# Patient Record
Sex: FEMALE | Race: WHITE | NOT HISPANIC OR LATINO | Employment: OTHER | URBAN - METROPOLITAN AREA
[De-identification: names, ages, dates, MRNs, and addresses within clinical notes are randomized per-mention and may not be internally consistent; named-entity substitution may affect disease eponyms.]

---

## 2017-10-02 ENCOUNTER — ALLSCRIPTS OFFICE VISIT (OUTPATIENT)
Dept: OTHER | Facility: OTHER | Age: 62
End: 2017-10-02

## 2017-10-03 ENCOUNTER — LAB CONVERSION - ENCOUNTER (OUTPATIENT)
Dept: OTHER | Facility: OTHER | Age: 62
End: 2017-10-03

## 2017-10-03 LAB
25(OH)D3 SERPL-MCNC: 35 NG/ML (ref 30–100)
ANACHOICE(R) SCREEN (HISTORICAL): NEGATIVE
BASOPHILS # BLD AUTO: 0.6 %
BASOPHILS # BLD AUTO: 69 CELLS/UL (ref 0–200)
C-REACTIVE PROTEIN (HISTORICAL): 12.3 MG/L
DEPRECATED RDW RBC AUTO: 13.2 % (ref 11–15)
EOSINOPHIL # BLD AUTO: 1.4 %
EOSINOPHIL # BLD AUTO: 161 CELLS/UL (ref 15–500)
ERYTHROCYTE SEDIMENTATION RATE (HISTORICAL): 25 MM/H
HBA1C MFR BLD HPLC: 5.8 % OF TOTAL HGB
HCT VFR BLD AUTO: 45.2 % (ref 35–45)
HGB BLD-MCNC: 14.8 G/DL (ref 11.7–15.5)
LYMPHOCYTES # BLD AUTO: 18 %
LYMPHOCYTES # BLD AUTO: 2070 CELLS/UL (ref 850–3900)
MCH RBC QN AUTO: 30 PG (ref 27–33)
MCHC RBC AUTO-ENTMCNC: 32.7 G/DL (ref 32–36)
MCV RBC AUTO: 91.5 FL (ref 80–100)
MONOCYTES # BLD AUTO: 1081 CELLS/UL (ref 200–950)
MONOCYTES (HISTORICAL): 9.4 %
NEUTROPHILS # BLD AUTO: 70.6 %
NEUTROPHILS # BLD AUTO: 8119 CELLS/UL (ref 1500–7800)
PLATELET # BLD AUTO: 197 THOUSAND/UL (ref 140–400)
PMV BLD AUTO: 11.1 FL (ref 7.5–12.5)
RBC # BLD AUTO: 4.94 MILLION/UL (ref 3.8–5.1)
RHEUMATOID FACTOR (HISTORICAL): <14 IU/ML
T4 FREE SERPL-MCNC: 1 NG/DL (ref 0.8–1.8)
THYROID MICROSOMAL ANTIBODY (HISTORICAL): 332 IU/ML
TSH SERPL DL<=0.05 MIU/L-ACNC: 1.94 MIU/L (ref 0.4–4.5)
WBC # BLD AUTO: 11.5 THOUSAND/UL (ref 3.8–10.8)

## 2017-10-04 ENCOUNTER — GENERIC CONVERSION - ENCOUNTER (OUTPATIENT)
Dept: OTHER | Facility: OTHER | Age: 62
End: 2017-10-04

## 2017-10-23 ENCOUNTER — GENERIC CONVERSION - ENCOUNTER (OUTPATIENT)
Dept: OTHER | Facility: OTHER | Age: 62
End: 2017-10-23

## 2017-11-16 ENCOUNTER — GENERIC CONVERSION - ENCOUNTER (OUTPATIENT)
Dept: OTHER | Facility: OTHER | Age: 62
End: 2017-11-16

## 2018-01-14 VITALS
HEIGHT: 65 IN | WEIGHT: 234 LBS | DIASTOLIC BLOOD PRESSURE: 66 MMHG | BODY MASS INDEX: 38.99 KG/M2 | SYSTOLIC BLOOD PRESSURE: 144 MMHG

## 2018-01-15 NOTE — MISCELLANEOUS
Message  PC left msg returning her call, saw consult would recommend neurology and/or rheumatology and then if needed perhaps further endocrine follow up        Signatures   Electronically signed by : Arianna Lewis MD; Nov 16 2017  6:34PM EST                       (Author)

## 2018-01-16 NOTE — MISCELLANEOUS
Message  Results reviewed with patient advised endocrine follow up  Patient reported did have history of Grave's disease  Will make appointment with endocrine        Signatures   Electronically signed by : Wilson Campa MD; Oct  4 2017  9:31PM EST                       (Author)

## 2018-03-22 ENCOUNTER — DOCTOR'S OFFICE (OUTPATIENT)
Dept: URBAN - METROPOLITAN AREA CLINIC 137 | Facility: CLINIC | Age: 63
Setting detail: OPHTHALMOLOGY
End: 2018-03-22
Payer: COMMERCIAL

## 2018-03-22 DIAGNOSIS — H52.4: ICD-10-CM

## 2018-03-22 PROCEDURE — 92014 COMPRE OPH EXAM EST PT 1/>: CPT | Performed by: OPHTHALMOLOGY

## 2018-03-22 ASSESSMENT — REFRACTION_CURRENTRX
OD_OVR_VA: 20/
OD_OVR_VA: 20/
OS_ADD: +2.25
OD_OVR_VA: 20/
OD_VPRISM_DIRECTION: BF
OD_AXIS: 180
OD_CYLINDER: +1.25
OS_OVR_VA: 20/
OS_SPHERE: +1.00
OD_SPHERE: +1.00
OS_AXIS: 001
OS_VPRISM_DIRECTION: BF
OS_CYLINDER: +1.50
OS_OVR_VA: 20/
OD_ADD: +2.25
OS_OVR_VA: 20/

## 2018-03-22 ASSESSMENT — VISUAL ACUITY
OS_BCVA: 20/30
OD_BCVA: 20/40

## 2018-03-22 ASSESSMENT — REFRACTION_OUTSIDERX
OD_AXIS: 180
OD_VA3: 20/
OS_VA1: 20/60+2
OS_VA2: 20/
OU_VA: 20/
OD_CYLINDER: +1.25
OS_ADD: +2.50
OD_ADD: +2.25
OD_ADD: +2.50
OU_VA: 20/
OS_VA1: 20/30+2
OS_VA2: 20/40(J3)
OS_SPHERE: +0.25
OD_SPHERE: +1.25
OD_VA3: 20/
OS_CYLINDER: +1.25
OD_VA1: 20/25-3
OS_VA3: 20/
OD_SPHERE: +1.00
OS_AXIS: 125
OD_VA2: 20/
OD_VA1: 20/40+1
OS_ADD: +2.25
OS_CYLINDER: +1.50
OS_VA3: 20/
OS_SPHERE: +1.00
OD_CYLINDER: +1.25
OD_VA2: 20/40(J3)
OS_AXIS: 180
OD_AXIS: 180

## 2018-03-22 ASSESSMENT — SPHEQUIV_DERIVED
OS_SPHEQUIV: 0.625
OD_SPHEQUIV: 1.875

## 2018-03-22 ASSESSMENT — REFRACTION_AUTOREFRACTION
OS_CYLINDER: +1.75
OD_AXIS: 002
OS_SPHERE: -0.25
OS_AXIS: 178
OD_CYLINDER: +1.75
OD_SPHERE: +1.00

## 2018-03-22 ASSESSMENT — REFRACTION_MANIFEST
OD_VA1: 20/
OD_VA2: 20/
OS_VA3: 20/
OD_VA3: 20/
OS_VA1: 20/
OU_VA: 20/
OS_VA2: 20/

## 2018-03-22 ASSESSMENT — CONFRONTATIONAL VISUAL FIELD TEST (CVF)
OD_FINDINGS: FULL
OS_FINDINGS: FULL

## 2018-03-30 ENCOUNTER — DOCTOR'S OFFICE (OUTPATIENT)
Dept: URBAN - METROPOLITAN AREA CLINIC 137 | Facility: CLINIC | Age: 63
Setting detail: OPHTHALMOLOGY
End: 2018-03-30
Payer: COMMERCIAL

## 2018-03-30 DIAGNOSIS — H25.012: ICD-10-CM

## 2018-03-30 PROCEDURE — 92136 OPHTHALMIC BIOMETRY: CPT | Performed by: OPHTHALMOLOGY

## 2018-03-30 PROCEDURE — CATARACT K CATARACT KIT: Performed by: OPHTHALMOLOGY

## 2018-04-05 ENCOUNTER — DOCTOR'S OFFICE (OUTPATIENT)
Dept: URBAN - METROPOLITAN AREA CLINIC 137 | Facility: CLINIC | Age: 63
Setting detail: OPHTHALMOLOGY
End: 2018-04-05
Payer: COMMERCIAL

## 2018-04-05 DIAGNOSIS — H40.013: ICD-10-CM

## 2018-04-05 PROCEDURE — 92083 EXTENDED VISUAL FIELD XM: CPT | Performed by: OPHTHALMOLOGY

## 2018-04-05 PROCEDURE — 92133 CPTRZD OPH DX IMG PST SGM ON: CPT | Performed by: OPHTHALMOLOGY

## 2018-05-14 ENCOUNTER — AMBUL SURGICAL CARE (OUTPATIENT)
Dept: URBAN - METROPOLITAN AREA SURGERY 32 | Facility: SURGERY | Age: 63
Setting detail: OPHTHALMOLOGY
End: 2018-05-14
Payer: COMMERCIAL

## 2018-05-14 DIAGNOSIS — H25.12: ICD-10-CM

## 2018-05-14 PROCEDURE — 66984 XCAPSL CTRC RMVL W/O ECP: CPT | Performed by: OPHTHALMOLOGY

## 2018-05-14 PROCEDURE — G8918 PT W/O PREOP ORDER IV AB PRO: HCPCS | Performed by: OPHTHALMOLOGY

## 2018-05-14 PROCEDURE — G8907 PT DOC NO EVENTS ON DISCHARG: HCPCS | Performed by: OPHTHALMOLOGY

## 2018-05-15 ENCOUNTER — DOCTOR'S OFFICE (OUTPATIENT)
Dept: URBAN - METROPOLITAN AREA CLINIC 137 | Facility: CLINIC | Age: 63
Setting detail: OPHTHALMOLOGY
End: 2018-05-15
Payer: COMMERCIAL

## 2018-05-15 ENCOUNTER — RX ONLY (RX ONLY)
Age: 63
End: 2018-05-15

## 2018-05-15 DIAGNOSIS — H25.11: ICD-10-CM

## 2018-05-15 DIAGNOSIS — Z96.1: ICD-10-CM

## 2018-05-15 PROCEDURE — 92136 OPHTHALMIC BIOMETRY: CPT | Performed by: OPHTHALMOLOGY

## 2018-05-15 PROCEDURE — 99024 POSTOP FOLLOW-UP VISIT: CPT | Performed by: OPHTHALMOLOGY

## 2018-05-15 ASSESSMENT — REFRACTION_CURRENTRX
OS_OVR_VA: 20/
OS_OVR_VA: 20/
OD_CYLINDER: +1.25
OD_ADD: +2.25
OS_SPHERE: +1.00
OD_OVR_VA: 20/
OS_VPRISM_DIRECTION: BF
OD_SPHERE: +1.00
OD_OVR_VA: 20/
OD_VPRISM_DIRECTION: BF
OS_OVR_VA: 20/
OD_OVR_VA: 20/
OS_CYLINDER: +1.50
OS_ADD: +2.25
OD_AXIS: 180
OS_AXIS: 001

## 2018-05-15 ASSESSMENT — VISUAL ACUITY
OS_BCVA: 20/30
OD_BCVA: 20/300

## 2018-05-15 ASSESSMENT — REFRACTION_OUTSIDERX
OD_ADD: +2.25
OS_VA1: 20/30+2
OD_SPHERE: +1.00
OD_VA1: 20/25-3
OS_ADD: +2.50
OD_CYLINDER: +1.25
OS_VA2: 20/40(J3)
OS_SPHERE: +1.00
OS_SPHERE: +0.25
OS_VA1: 20/60+2
OD_VA3: 20/
OS_ADD: +2.25
OD_VA2: 20/40(J3)
OU_VA: 20/
OU_VA: 20/
OS_VA3: 20/
OD_SPHERE: +1.25
OS_VA2: 20/
OD_AXIS: 180
OS_VA3: 20/
OD_CYLINDER: +1.25
OD_AXIS: 180
OS_CYLINDER: +1.50
OS_AXIS: 125
OS_CYLINDER: +1.25
OD_VA3: 20/
OD_VA2: 20/
OD_VA1: 20/40+1
OS_AXIS: 180
OD_ADD: +2.50

## 2018-05-15 ASSESSMENT — REFRACTION_MANIFEST
OD_VA1: 20/
OD_VA3: 20/
OU_VA: 20/
OS_VA3: 20/
OD_VA2: 20/
OS_VA1: 20/
OS_VA2: 20/

## 2018-05-15 ASSESSMENT — SPHEQUIV_DERIVED
OD_SPHEQUIV: 1.875
OS_SPHEQUIV: 0.625

## 2018-05-15 ASSESSMENT — REFRACTION_AUTOREFRACTION
OS_AXIS: 178
OD_SPHERE: +1.00
OD_CYLINDER: +1.75
OS_CYLINDER: +1.75
OS_SPHERE: -0.25
OD_AXIS: 002

## 2018-05-22 ENCOUNTER — DOCTOR'S OFFICE (OUTPATIENT)
Dept: URBAN - METROPOLITAN AREA CLINIC 137 | Facility: CLINIC | Age: 63
Setting detail: OPHTHALMOLOGY
End: 2018-05-22
Payer: COMMERCIAL

## 2018-05-22 DIAGNOSIS — Z96.1: ICD-10-CM

## 2018-05-22 PROCEDURE — 99024 POSTOP FOLLOW-UP VISIT: CPT | Performed by: OPHTHALMOLOGY

## 2018-05-22 ASSESSMENT — REFRACTION_OUTSIDERX
OD_SPHERE: +1.00
OS_VA1: 20/30+2
OS_AXIS: 180
OS_CYLINDER: +1.25
OD_VA1: 20/40+1
OS_VA3: 20/
OD_CYLINDER: +1.25
OS_AXIS: 125
OS_CYLINDER: +1.50
OD_AXIS: 180
OD_VA2: 20/
OS_VA3: 20/
OU_VA: 20/
OD_VA3: 20/
OS_VA2: 20/
OD_ADD: +2.25
OU_VA: 20/
OS_SPHERE: +0.25
OS_VA2: 20/40(J3)
OD_ADD: +2.50
OD_CYLINDER: +1.25
OD_VA3: 20/
OD_AXIS: 180
OD_VA2: 20/40(J3)
OD_SPHERE: +1.25
OD_VA1: 20/25-3
OS_ADD: +2.25
OS_SPHERE: +1.00
OS_ADD: +2.50
OS_VA1: 20/60+2

## 2018-05-22 ASSESSMENT — REFRACTION_CURRENTRX
OS_VPRISM_DIRECTION: BF
OD_OVR_VA: 20/
OS_OVR_VA: 20/
OS_ADD: +2.25
OD_SPHERE: +1.00
OD_CYLINDER: +1.25
OS_CYLINDER: +1.50
OS_SPHERE: +1.00
OD_OVR_VA: 20/
OD_ADD: +2.25
OS_OVR_VA: 20/
OS_AXIS: 001
OS_OVR_VA: 20/
OD_AXIS: 180
OD_OVR_VA: 20/
OD_VPRISM_DIRECTION: BF

## 2018-05-22 ASSESSMENT — VISUAL ACUITY
OS_BCVA: 20/30-1
OD_BCVA: 20/60-2

## 2018-05-22 ASSESSMENT — REFRACTION_MANIFEST
OS_VA2: 20/
OU_VA: 20/
OS_VA1: 20/
OD_VA1: 20/
OD_VA3: 20/
OS_VA3: 20/
OD_VA2: 20/

## 2018-05-22 ASSESSMENT — SPHEQUIV_DERIVED
OS_SPHEQUIV: -0.25
OD_SPHEQUIV: 1.75

## 2018-05-22 ASSESSMENT — REFRACTION_AUTOREFRACTION
OD_SPHERE: +1.00
OD_CYLINDER: +1.50
OS_CYLINDER: +2.00
OS_AXIS: 005
OD_AXIS: 004
OS_SPHERE: -1.25

## 2018-06-11 ENCOUNTER — AMBUL SURGICAL CARE (OUTPATIENT)
Dept: URBAN - METROPOLITAN AREA SURGERY 32 | Facility: SURGERY | Age: 63
Setting detail: OPHTHALMOLOGY
End: 2018-06-11
Payer: COMMERCIAL

## 2018-06-11 DIAGNOSIS — H25.11: ICD-10-CM

## 2018-06-11 PROCEDURE — 66984 XCAPSL CTRC RMVL W/O ECP: CPT | Performed by: OPHTHALMOLOGY

## 2018-06-11 PROCEDURE — G8907 PT DOC NO EVENTS ON DISCHARG: HCPCS | Performed by: OPHTHALMOLOGY

## 2018-06-11 PROCEDURE — G8918 PT W/O PREOP ORDER IV AB PRO: HCPCS | Performed by: OPHTHALMOLOGY

## 2018-06-12 ENCOUNTER — RX ONLY (RX ONLY)
Age: 63
End: 2018-06-12

## 2018-06-12 ENCOUNTER — DOCTOR'S OFFICE (OUTPATIENT)
Dept: URBAN - METROPOLITAN AREA CLINIC 137 | Facility: CLINIC | Age: 63
Setting detail: OPHTHALMOLOGY
End: 2018-06-12
Payer: COMMERCIAL

## 2018-06-12 DIAGNOSIS — Z96.1: ICD-10-CM

## 2018-06-12 PROCEDURE — 99024 POSTOP FOLLOW-UP VISIT: CPT | Performed by: OPHTHALMOLOGY

## 2018-06-12 ASSESSMENT — REFRACTION_CURRENTRX
OD_OVR_VA: 20/
OS_VPRISM_DIRECTION: BF
OD_OVR_VA: 20/
OD_VPRISM_DIRECTION: BF
OS_ADD: +2.25
OS_OVR_VA: 20/
OD_OVR_VA: 20/
OS_OVR_VA: 20/
OD_SPHERE: +1.00
OS_SPHERE: +1.00
OS_CYLINDER: +1.50
OS_OVR_VA: 20/
OS_AXIS: 001
OD_CYLINDER: +1.25
OD_ADD: +2.25
OD_AXIS: 180

## 2018-06-12 ASSESSMENT — VISUAL ACUITY
OS_BCVA: 20/200
OD_BCVA: 20/70

## 2018-06-12 ASSESSMENT — REFRACTION_OUTSIDERX
OD_VA1: 20/40+1
OS_ADD: +2.50
OU_VA: 20/
OS_SPHERE: +1.00
OD_VA1: 20/25-3
OD_SPHERE: +1.00
OD_VA2: 20/40(J3)
OS_CYLINDER: +1.25
OS_SPHERE: +0.25
OD_AXIS: 180
OU_VA: 20/
OS_VA2: 20/40(J3)
OS_AXIS: 125
OS_CYLINDER: +1.50
OD_VA3: 20/
OD_CYLINDER: +1.25
OS_ADD: +2.25
OD_SPHERE: +1.25
OD_ADD: +2.25
OD_AXIS: 180
OD_CYLINDER: +1.25
OS_VA1: 20/30+2
OS_AXIS: 180
OS_VA1: 20/60+2
OS_VA3: 20/
OD_VA3: 20/
OS_VA2: 20/
OD_ADD: +2.50
OD_VA2: 20/
OS_VA3: 20/

## 2018-06-12 ASSESSMENT — REFRACTION_MANIFEST
OS_VA3: 20/
OS_VA1: 20/
OD_VA1: 20/
OD_VA2: 20/
OD_VA3: 20/
OU_VA: 20/
OS_VA2: 20/

## 2018-06-12 ASSESSMENT — REFRACTION_AUTOREFRACTION
OS_SPHERE: -1.25
OS_CYLINDER: +2.00
OD_AXIS: 004
OD_CYLINDER: +1.50
OD_SPHERE: +1.00
OS_AXIS: 005

## 2018-06-12 ASSESSMENT — SPHEQUIV_DERIVED
OS_SPHEQUIV: -0.25
OD_SPHEQUIV: 1.75

## 2018-06-19 ENCOUNTER — DOCTOR'S OFFICE (OUTPATIENT)
Dept: URBAN - METROPOLITAN AREA CLINIC 137 | Facility: CLINIC | Age: 63
Setting detail: OPHTHALMOLOGY
End: 2018-06-19
Payer: COMMERCIAL

## 2018-06-19 DIAGNOSIS — Z96.1: ICD-10-CM

## 2018-06-19 PROCEDURE — 99024 POSTOP FOLLOW-UP VISIT: CPT | Performed by: OPHTHALMOLOGY

## 2018-06-19 ASSESSMENT — REFRACTION_MANIFEST
OD_VA3: 20/
OS_VA1: 20/
OD_VA2: 20/
OU_VA: 20/
OS_VA2: 20/
OS_VA3: 20/
OD_VA1: 20/

## 2018-06-19 ASSESSMENT — REFRACTION_AUTOREFRACTION
OD_AXIS: 004
OS_SPHERE: -1.25
OD_CYLINDER: +1.50
OS_CYLINDER: +2.00
OS_AXIS: 005
OD_SPHERE: +1.00

## 2018-06-19 ASSESSMENT — REFRACTION_OUTSIDERX
OS_ADD: +2.25
OD_ADD: +2.50
OD_VA1: 20/25-3
OS_SPHERE: +1.00
OD_CYLINDER: +1.25
OD_VA3: 20/
OU_VA: 20/
OS_VA3: 20/
OS_ADD: +2.50
OD_VA1: 20/40+1
OS_CYLINDER: +1.50
OS_VA1: 20/30+2
OS_VA2: 20/40(J3)
OU_VA: 20/
OD_VA2: 20/40(J3)
OD_SPHERE: +1.25
OD_ADD: +2.25
OD_VA3: 20/
OS_CYLINDER: +1.25
OD_SPHERE: +1.00
OS_AXIS: 125
OS_AXIS: 180
OS_VA1: 20/60+2
OD_AXIS: 180
OD_CYLINDER: +1.25
OS_VA3: 20/
OD_AXIS: 180
OD_VA2: 20/
OS_VA2: 20/
OS_SPHERE: +0.25

## 2018-06-19 ASSESSMENT — REFRACTION_CURRENTRX
OS_AXIS: 001
OD_AXIS: 180
OS_OVR_VA: 20/
OS_CYLINDER: +1.50
OD_OVR_VA: 20/
OD_VPRISM_DIRECTION: BF
OD_OVR_VA: 20/
OD_ADD: +2.25
OD_OVR_VA: 20/
OS_ADD: +2.25
OD_SPHERE: +1.00
OS_OVR_VA: 20/
OS_SPHERE: +1.00
OD_CYLINDER: +1.25
OS_OVR_VA: 20/
OS_VPRISM_DIRECTION: BF

## 2018-06-19 ASSESSMENT — SPHEQUIV_DERIVED
OS_SPHEQUIV: -0.25
OD_SPHEQUIV: 1.75

## 2018-06-19 ASSESSMENT — VISUAL ACUITY
OD_BCVA: 20/50
OS_BCVA: 20/200

## 2018-06-20 ENCOUNTER — DOCTOR'S OFFICE (OUTPATIENT)
Dept: URBAN - METROPOLITAN AREA CLINIC 136 | Facility: CLINIC | Age: 63
Setting detail: OPHTHALMOLOGY
End: 2018-06-20
Payer: COMMERCIAL

## 2018-06-20 DIAGNOSIS — H27.131: ICD-10-CM

## 2018-06-20 DIAGNOSIS — H40.013: ICD-10-CM

## 2018-06-20 DIAGNOSIS — H43.811: ICD-10-CM

## 2018-06-20 DIAGNOSIS — Z96.1: ICD-10-CM

## 2018-06-20 PROCEDURE — 99024 POSTOP FOLLOW-UP VISIT: CPT | Performed by: OPHTHALMOLOGY

## 2018-06-20 ASSESSMENT — CONFRONTATIONAL VISUAL FIELD TEST (CVF)
OD_FINDINGS: FULL
OS_FINDINGS: FULL

## 2018-06-21 ASSESSMENT — REFRACTION_OUTSIDERX
OS_CYLINDER: +1.50
OS_VA2: 20/40(J3)
OD_VA2: 20/
OU_VA: 20/
OS_ADD: +2.25
OS_VA3: 20/
OD_AXIS: 180
OD_CYLINDER: +1.25
OD_CYLINDER: +1.25
OS_VA3: 20/
OS_VA2: 20/
OD_VA1: 20/25-3
OS_AXIS: 125
OU_VA: 20/
OD_AXIS: 180
OD_VA1: 20/40+1
OD_VA3: 20/
OS_VA1: 20/30+2
OS_ADD: +2.50
OS_CYLINDER: +1.25
OS_VA1: 20/60+2
OD_VA2: 20/40(J3)
OD_SPHERE: +1.00
OS_SPHERE: +0.25
OD_VA3: 20/
OS_AXIS: 180
OD_ADD: +2.50
OD_SPHERE: +1.25
OD_ADD: +2.25
OS_SPHERE: +1.00

## 2018-06-21 ASSESSMENT — REFRACTION_MANIFEST
OU_VA: 20/
OD_VA1: 20/
OD_VA2: 20/
OD_VA3: 20/
OS_VA2: 20/
OS_VA1: 20/
OS_VA3: 20/

## 2018-06-21 ASSESSMENT — REFRACTION_CURRENTRX
OS_OVR_VA: 20/
OD_OVR_VA: 20/
OS_SPHERE: +1.00
OD_OVR_VA: 20/
OD_OVR_VA: 20/
OS_CYLINDER: +1.50
OS_AXIS: 001
OD_VPRISM_DIRECTION: BF
OD_CYLINDER: +1.25
OS_OVR_VA: 20/
OS_ADD: +2.25
OD_SPHERE: +1.00
OS_OVR_VA: 20/
OD_ADD: +2.25
OS_VPRISM_DIRECTION: BF
OD_AXIS: 180

## 2018-06-21 ASSESSMENT — VISUAL ACUITY
OD_BCVA: 20/40
OS_BCVA: 20/400

## 2018-06-21 ASSESSMENT — REFRACTION_AUTOREFRACTION
OS_CYLINDER: +2.00
OD_SPHERE: +1.00
OD_AXIS: 004
OD_CYLINDER: +1.50
OS_SPHERE: -1.25
OS_AXIS: 005

## 2018-06-21 ASSESSMENT — SPHEQUIV_DERIVED
OD_SPHEQUIV: 1.75
OS_SPHEQUIV: -0.25

## 2018-06-28 ENCOUNTER — AMBUL SURGICAL CARE (OUTPATIENT)
Dept: URBAN - METROPOLITAN AREA SURGERY 32 | Facility: SURGERY | Age: 63
Setting detail: OPHTHALMOLOGY
End: 2018-06-28
Payer: COMMERCIAL

## 2018-06-28 DIAGNOSIS — H27.131: ICD-10-CM

## 2018-06-28 DIAGNOSIS — H33.311: ICD-10-CM

## 2018-06-28 DIAGNOSIS — H43.811: ICD-10-CM

## 2018-06-28 PROCEDURE — G8907 PT DOC NO EVENTS ON DISCHARG: HCPCS | Performed by: OPHTHALMOLOGY

## 2018-06-28 PROCEDURE — 66986 EXCHANGE LENS PROSTHESIS: CPT | Performed by: OPHTHALMOLOGY

## 2018-06-28 PROCEDURE — 67039 LASER TREATMENT OF RETINA: CPT | Performed by: OPHTHALMOLOGY

## 2018-06-28 PROCEDURE — G8918 PT W/O PREOP ORDER IV AB PRO: HCPCS | Performed by: OPHTHALMOLOGY

## 2018-06-29 ENCOUNTER — DOCTOR'S OFFICE (OUTPATIENT)
Dept: URBAN - METROPOLITAN AREA CLINIC 137 | Facility: CLINIC | Age: 63
Setting detail: OPHTHALMOLOGY
End: 2018-06-29
Payer: COMMERCIAL

## 2018-06-29 DIAGNOSIS — H27.131: ICD-10-CM

## 2018-06-29 DIAGNOSIS — Z96.1: ICD-10-CM

## 2018-06-29 DIAGNOSIS — H40.013: ICD-10-CM

## 2018-06-29 DIAGNOSIS — H43.811: ICD-10-CM

## 2018-06-29 PROCEDURE — 99024 POSTOP FOLLOW-UP VISIT: CPT | Performed by: OPHTHALMOLOGY

## 2018-06-29 ASSESSMENT — VISUAL ACUITY
OD_BCVA: 20/30-1
OS_BCVA: LP

## 2018-06-29 ASSESSMENT — SPHEQUIV_DERIVED
OD_SPHEQUIV: 1.75
OS_SPHEQUIV: -0.25

## 2018-06-29 ASSESSMENT — REFRACTION_AUTOREFRACTION
OS_AXIS: 005
OD_SPHERE: +1.00
OD_CYLINDER: +1.50
OD_AXIS: 004
OS_CYLINDER: +2.00
OS_SPHERE: -1.25

## 2018-07-02 ENCOUNTER — DOCTOR'S OFFICE (OUTPATIENT)
Dept: URBAN - METROPOLITAN AREA CLINIC 136 | Facility: CLINIC | Age: 63
Setting detail: OPHTHALMOLOGY
End: 2018-07-02
Payer: COMMERCIAL

## 2018-07-02 DIAGNOSIS — H27.131: ICD-10-CM

## 2018-07-02 PROCEDURE — 99024 POSTOP FOLLOW-UP VISIT: CPT | Performed by: OPHTHALMOLOGY

## 2018-07-02 ASSESSMENT — CORNEAL EDEMA CLINICAL DESCRIPTION: OD_CORNEALEDEMA: 1+

## 2018-07-02 ASSESSMENT — CONFRONTATIONAL VISUAL FIELD TEST (CVF)
OS_FINDINGS: FULL
OD_FINDINGS: FULL

## 2018-07-04 ASSESSMENT — REFRACTION_MANIFEST
OD_VA2: 20/
OD_VA2: 20/
OD_VA3: 20/
OD_VA1: 20/
OD_VA3: 20/
OD_VA2: 20/
OD_VA1: 20/
OU_VA: 20/
OS_VA3: 20/
OS_VA1: 20/
OS_VA2: 20/
OS_VA1: 20/
OS_VA2: 20/
OD_VA3: 20/
OU_VA: 20/
OS_VA3: 20/
OD_VA1: 20/
OU_VA: 20/
OS_VA2: 20/
OS_VA1: 20/
OS_VA3: 20/

## 2018-07-04 ASSESSMENT — REFRACTION_CURRENTRX
OD_OVR_VA: 20/
OD_OVR_VA: 20/
OS_OVR_VA: 20/
OS_OVR_VA: 20/
OD_OVR_VA: 20/
OS_OVR_VA: 20/

## 2018-07-04 ASSESSMENT — REFRACTION_AUTOREFRACTION
OS_SPHERE: -1.25
OD_SPHERE: +1.00
OS_CYLINDER: +2.00
OS_AXIS: 005
OD_AXIS: 004
OD_CYLINDER: +1.50

## 2018-07-04 ASSESSMENT — VISUAL ACUITY
OS_BCVA: 20/CF @FACE
OD_BCVA: 20/30-1

## 2018-07-04 ASSESSMENT — SPHEQUIV_DERIVED
OS_SPHEQUIV: -0.25
OD_SPHEQUIV: 1.75

## 2018-07-05 ENCOUNTER — RX ONLY (RX ONLY)
Age: 63
End: 2018-07-05

## 2018-07-05 ENCOUNTER — DOCTOR'S OFFICE (OUTPATIENT)
Dept: URBAN - METROPOLITAN AREA CLINIC 136 | Facility: CLINIC | Age: 63
Setting detail: OPHTHALMOLOGY
End: 2018-07-05
Payer: COMMERCIAL

## 2018-07-05 DIAGNOSIS — H27.131: ICD-10-CM

## 2018-07-05 PROCEDURE — 99024 POSTOP FOLLOW-UP VISIT: CPT | Performed by: OPHTHALMOLOGY

## 2018-07-05 ASSESSMENT — REFRACTION_CURRENTRX
OS_OVR_VA: 20/
OD_OVR_VA: 20/

## 2018-07-05 ASSESSMENT — CORNEAL EDEMA CLINICAL DESCRIPTION: OD_CORNEALEDEMA: 1+

## 2018-07-05 ASSESSMENT — REFRACTION_MANIFEST
OU_VA: 20/
OD_VA2: 20/
OS_VA2: 20/
OU_VA: 20/
OS_VA2: 20/
OD_VA1: 20/
OS_VA3: 20/
OS_VA1: 20/
OU_VA: 20/
OD_VA3: 20/
OD_VA1: 20/
OD_VA3: 20/
OD_VA2: 20/
OD_VA2: 20/
OS_VA2: 20/
OS_VA1: 20/
OS_VA3: 20/
OD_VA3: 20/
OD_VA1: 20/
OS_VA3: 20/
OS_VA1: 20/

## 2018-07-05 ASSESSMENT — REFRACTION_AUTOREFRACTION
OD_SPHERE: +1.00
OD_AXIS: 004
OS_AXIS: 005
OS_CYLINDER: +2.00
OD_CYLINDER: +1.50
OS_SPHERE: -1.25

## 2018-07-05 ASSESSMENT — SPHEQUIV_DERIVED
OD_SPHEQUIV: 1.75
OS_SPHEQUIV: -0.25

## 2018-07-05 ASSESSMENT — VISUAL ACUITY
OD_BCVA: 20/30-1
OS_BCVA: 20/CF-6FT

## 2018-07-05 ASSESSMENT — CONFRONTATIONAL VISUAL FIELD TEST (CVF)
OD_FINDINGS: FULL
OS_FINDINGS: FULL

## 2018-07-13 ENCOUNTER — RX ONLY (RX ONLY)
Age: 63
End: 2018-07-13

## 2018-07-13 ENCOUNTER — DOCTOR'S OFFICE (OUTPATIENT)
Dept: URBAN - METROPOLITAN AREA CLINIC 137 | Facility: CLINIC | Age: 63
Setting detail: OPHTHALMOLOGY
End: 2018-07-13
Payer: COMMERCIAL

## 2018-07-13 DIAGNOSIS — H27.131: ICD-10-CM

## 2018-07-13 PROCEDURE — 99024 POSTOP FOLLOW-UP VISIT: CPT | Performed by: OPHTHALMOLOGY

## 2018-07-13 ASSESSMENT — REFRACTION_MANIFEST
OS_VA1: 20/
OU_VA: 20/
OS_VA2: 20/
OU_VA: 20/
OD_VA2: 20/
OD_VA3: 20/
OD_VA1: 20/
OD_VA2: 20/
OS_VA1: 20/
OS_VA3: 20/
OD_VA1: 20/
OD_VA3: 20/
OS_VA3: 20/
OS_VA2: 20/
OD_VA3: 20/
OD_VA2: 20/
OS_VA1: 20/
OD_VA1: 20/
OU_VA: 20/
OS_VA2: 20/
OS_VA3: 20/

## 2018-07-13 ASSESSMENT — CONFRONTATIONAL VISUAL FIELD TEST (CVF)
OD_FINDINGS: FULL
OS_FINDINGS: FULL

## 2018-07-19 ASSESSMENT — REFRACTION_AUTOREFRACTION
OS_CYLINDER: +2.00
OD_SPHERE: +1.00
OS_AXIS: 005
OD_CYLINDER: +1.50
OS_SPHERE: -1.25
OD_AXIS: 004

## 2018-07-19 ASSESSMENT — REFRACTION_CURRENTRX
OD_OVR_VA: 20/
OS_OVR_VA: 20/
OD_OVR_VA: 20/
OD_OVR_VA: 20/

## 2018-07-19 ASSESSMENT — VISUAL ACUITY
OD_BCVA: 20/30
OS_BCVA: 20/CF-6FT

## 2018-07-19 ASSESSMENT — SPHEQUIV_DERIVED
OS_SPHEQUIV: -0.25
OD_SPHEQUIV: 1.75

## 2018-08-06 ENCOUNTER — DOCTOR'S OFFICE (OUTPATIENT)
Dept: URBAN - METROPOLITAN AREA CLINIC 136 | Facility: CLINIC | Age: 63
Setting detail: OPHTHALMOLOGY
End: 2018-08-06
Payer: COMMERCIAL

## 2018-08-06 DIAGNOSIS — H27.131: ICD-10-CM

## 2018-08-06 PROCEDURE — 99024 POSTOP FOLLOW-UP VISIT: CPT | Performed by: OPHTHALMOLOGY

## 2018-08-06 ASSESSMENT — REFRACTION_AUTOREFRACTION
OD_AXIS: 091
OD_CYLINDER: -5.75
OS_SPHERE: -1.25
OS_AXIS: 005
OS_CYLINDER: +2.00
OD_SPHERE: 0.00

## 2018-08-06 ASSESSMENT — REFRACTION_MANIFEST
OS_VA2: 20/
OD_AXIS: 090
OS_VA2: 20/
OS_VA3: 20/
OD_VA2: 20/
OU_VA: 20/
OD_SPHERE: -1.50
OS_VA2: 20/
OD_VA1: 20/
OS_VA1: 20/
OD_VA1: 20/
OD_VA3: 20/
OD_VA1: 20/125
OS_VA3: 20/
OD_VA3: 20/
OS_VA1: 20/
OU_VA: 20/
OD_CYLINDER: -1.00
OS_VA3: 20/
OU_VA: 20/
OD_VA3: 20/
OD_VA2: 20/
OS_VA1: 20/
OD_VA2: 20/

## 2018-08-06 ASSESSMENT — REFRACTION_CURRENTRX
OD_OVR_VA: 20/
OS_OVR_VA: 20/

## 2018-08-06 ASSESSMENT — SPHEQUIV_DERIVED
OD_SPHEQUIV: -2.875
OS_SPHEQUIV: -0.25
OD_SPHEQUIV: -2

## 2018-08-06 ASSESSMENT — CONFRONTATIONAL VISUAL FIELD TEST (CVF)
OS_FINDINGS: FULL
OD_FINDINGS: FULL

## 2018-08-06 ASSESSMENT — VISUAL ACUITY
OS_BCVA: 20/300
OD_BCVA: 20/30

## 2018-08-07 ENCOUNTER — AMBUL SURGICAL CARE (OUTPATIENT)
Dept: URBAN - METROPOLITAN AREA SURGERY 32 | Facility: SURGERY | Age: 63
Setting detail: OPHTHALMOLOGY
End: 2018-08-07
Payer: COMMERCIAL

## 2018-08-07 DIAGNOSIS — H27.131: ICD-10-CM

## 2018-08-07 PROCEDURE — G8907 PT DOC NO EVENTS ON DISCHARG: HCPCS | Performed by: OPHTHALMOLOGY

## 2018-08-07 PROCEDURE — 65920 REMOVE IMPLANT OF EYE: CPT | Performed by: OPHTHALMOLOGY

## 2018-08-07 PROCEDURE — G8918 PT W/O PREOP ORDER IV AB PRO: HCPCS | Performed by: OPHTHALMOLOGY

## 2018-08-08 ENCOUNTER — DOCTOR'S OFFICE (OUTPATIENT)
Dept: URBAN - METROPOLITAN AREA CLINIC 137 | Facility: CLINIC | Age: 63
Setting detail: OPHTHALMOLOGY
End: 2018-08-08
Payer: COMMERCIAL

## 2018-08-08 DIAGNOSIS — H27.131: ICD-10-CM

## 2018-08-08 PROCEDURE — 99024 POSTOP FOLLOW-UP VISIT: CPT | Performed by: OPHTHALMOLOGY

## 2018-08-08 ASSESSMENT — VISUAL ACUITY
OD_BCVA: 20/30
OS_BCVA: CF 1FT

## 2018-08-08 ASSESSMENT — REFRACTION_MANIFEST
OD_VA3: 20/
OD_CYLINDER: -1.00
OD_VA1: 20/125
OD_VA2: 20/
OS_VA2: 20/
OD_VA2: 20/
OS_VA3: 20/
OS_VA2: 20/
OD_VA2: 20/
OU_VA: 20/
OD_VA3: 20/
OU_VA: 20/
OS_VA1: 20/
OS_VA1: 20/
OD_VA1: 20/
OD_VA1: 20/
OS_VA3: 20/
OS_VA1: 20/
OD_SPHERE: -1.50
OS_VA3: 20/
OU_VA: 20/
OD_VA3: 20/
OD_AXIS: 090
OS_VA2: 20/

## 2018-08-08 ASSESSMENT — REFRACTION_AUTOREFRACTION
OS_AXIS: 005
OD_CYLINDER: -5.75
OD_SPHERE: 0.00
OS_CYLINDER: +2.25
OS_SPHERE: -1.00
OD_AXIS: 091

## 2018-08-08 ASSESSMENT — CONFRONTATIONAL VISUAL FIELD TEST (CVF)
OS_FINDINGS: FULL
OD_FINDINGS: FULL

## 2018-08-08 ASSESSMENT — SPHEQUIV_DERIVED
OD_SPHEQUIV: -2.875
OD_SPHEQUIV: -2
OS_SPHEQUIV: 0.125

## 2018-08-08 ASSESSMENT — REFRACTION_CURRENTRX
OS_OVR_VA: 20/
OD_OVR_VA: 20/

## 2018-08-24 ENCOUNTER — DOCTOR'S OFFICE (OUTPATIENT)
Dept: URBAN - METROPOLITAN AREA CLINIC 137 | Facility: CLINIC | Age: 63
Setting detail: OPHTHALMOLOGY
End: 2018-08-24
Payer: COMMERCIAL

## 2018-08-24 ENCOUNTER — RX ONLY (RX ONLY)
Age: 63
End: 2018-08-24

## 2018-08-24 DIAGNOSIS — H27.01: ICD-10-CM

## 2018-08-24 PROBLEM — H40.013 GLAUCOMA SUSPECT OPEN ANGLE; BOTH EYES: Status: ACTIVE | Noted: 2018-03-22

## 2018-08-24 PROBLEM — Z96.1 PSEUDOPHAKIA: Status: ACTIVE | Noted: 2018-05-15

## 2018-08-24 PROCEDURE — 99024 POSTOP FOLLOW-UP VISIT: CPT | Performed by: OPHTHALMOLOGY

## 2018-08-24 ASSESSMENT — CONFRONTATIONAL VISUAL FIELD TEST (CVF)
OD_FINDINGS: FULL
OS_FINDINGS: FULL

## 2018-08-24 ASSESSMENT — REFRACTION_MANIFEST
OD_VA2: 20/
OS_VA2: 20/
OS_VA3: 20/
OS_VA1: 20/
OD_CYLINDER: SPH
OD_VA1: 20/
OS_VA1: 20/
OU_VA: 20/
OS_VA1: 20/
OD_VA2: 20/
OS_VA2: 20/
OS_VA3: 20/
OD_VA3: 20/
OD_VA1: 20/50-1
OD_SPHERE: +10.75
OD_VA3: 20/
OD_VA1: 20/
OS_VA2: 20/
OD_VA3: 20/
OU_VA: 20/
OU_VA: 20/
OD_VA2: 20/
OS_VA3: 20/

## 2018-08-26 ASSESSMENT — REFRACTION_CURRENTRX
OS_OVR_VA: 20/
OS_OVR_VA: 20/
OD_OVR_VA: 20/
OD_OVR_VA: 20/
OS_OVR_VA: 20/
OD_OVR_VA: 20/

## 2018-08-26 ASSESSMENT — REFRACTION_AUTOREFRACTION
OS_CYLINDER: +2.25
OD_CYLINDER: -5.75
OD_SPHERE: 0.00
OS_AXIS: 005
OD_AXIS: 091
OS_SPHERE: -1.00

## 2018-08-26 ASSESSMENT — VISUAL ACUITY
OS_BCVA: CF 1FT
OD_BCVA: 20/70

## 2018-08-26 ASSESSMENT — SPHEQUIV_DERIVED
OD_SPHEQUIV: -2.875
OS_SPHEQUIV: 0.125

## 2021-01-13 ENCOUNTER — TRANSCRIBE ORDERS (OUTPATIENT)
Dept: LAB | Facility: CLINIC | Age: 66
End: 2021-01-13

## 2021-01-13 ENCOUNTER — LAB (OUTPATIENT)
Dept: LAB | Facility: CLINIC | Age: 66
End: 2021-01-13
Payer: COMMERCIAL

## 2021-01-13 ENCOUNTER — OFFICE VISIT (OUTPATIENT)
Dept: LAB | Facility: CLINIC | Age: 66
End: 2021-01-13
Payer: COMMERCIAL

## 2021-01-13 DIAGNOSIS — Z01.812 PRE-OPERATIVE LABORATORY EXAMINATION: ICD-10-CM

## 2021-01-13 DIAGNOSIS — M19.011 ARTHRITIS OF RIGHT SHOULDER REGION: ICD-10-CM

## 2021-01-13 DIAGNOSIS — Z79.899 ENCOUNTER FOR LONG-TERM (CURRENT) USE OF OTHER MEDICATIONS: ICD-10-CM

## 2021-01-13 DIAGNOSIS — Z01.818 OTHER SPECIFIED PRE-OPERATIVE EXAMINATION: ICD-10-CM

## 2021-01-13 DIAGNOSIS — M79.7 SCAPULOHUMERAL FIBROSITIS: ICD-10-CM

## 2021-01-13 DIAGNOSIS — Z01.810 PRE-OPERATIVE CARDIOVASCULAR EXAMINATION: ICD-10-CM

## 2021-01-13 DIAGNOSIS — H02.401 PTOSIS OF RIGHT EYELID: Primary | ICD-10-CM

## 2021-01-13 DIAGNOSIS — H02.401 PTOSIS OF RIGHT EYELID: ICD-10-CM

## 2021-01-13 DIAGNOSIS — M79.7 SCAPULOHUMERAL FIBROSITIS: Primary | ICD-10-CM

## 2021-01-13 LAB
ALBUMIN SERPL BCP-MCNC: 3.3 G/DL (ref 3.5–5)
ALP SERPL-CCNC: 140 U/L (ref 46–116)
ALT SERPL W P-5'-P-CCNC: 41 U/L (ref 12–78)
ANION GAP SERPL CALCULATED.3IONS-SCNC: 6 MMOL/L (ref 4–13)
AST SERPL W P-5'-P-CCNC: 21 U/L (ref 5–45)
ATRIAL RATE: 79 BPM
BASOPHILS # BLD AUTO: 0.07 THOUSANDS/ΜL (ref 0–0.1)
BASOPHILS NFR BLD AUTO: 1 % (ref 0–1)
BILIRUB SERPL-MCNC: 0.2 MG/DL (ref 0.2–1)
BUN SERPL-MCNC: 22 MG/DL (ref 5–25)
CALCIUM ALBUM COR SERPL-MCNC: 10.3 MG/DL (ref 8.3–10.1)
CALCIUM SERPL-MCNC: 9.7 MG/DL (ref 8.3–10.1)
CHLORIDE SERPL-SCNC: 104 MMOL/L (ref 100–108)
CO2 SERPL-SCNC: 29 MMOL/L (ref 21–32)
CREAT SERPL-MCNC: 0.83 MG/DL (ref 0.6–1.3)
EOSINOPHIL # BLD AUTO: 0.17 THOUSAND/ΜL (ref 0–0.61)
EOSINOPHIL NFR BLD AUTO: 2 % (ref 0–6)
ERYTHROCYTE [DISTWIDTH] IN BLOOD BY AUTOMATED COUNT: 13.4 % (ref 11.6–15.1)
ERYTHROCYTE [SEDIMENTATION RATE] IN BLOOD: 42 MM/HOUR (ref 0–29)
GFR SERPL CREATININE-BSD FRML MDRD: 73 ML/MIN/1.73SQ M
GLUCOSE SERPL-MCNC: 75 MG/DL (ref 65–140)
HCT VFR BLD AUTO: 48.5 % (ref 34.8–46.1)
HGB BLD-MCNC: 15.1 G/DL (ref 11.5–15.4)
IMM GRANULOCYTES # BLD AUTO: 0.14 THOUSAND/UL (ref 0–0.2)
IMM GRANULOCYTES NFR BLD AUTO: 1 % (ref 0–2)
LYMPHOCYTES # BLD AUTO: 2.34 THOUSANDS/ΜL (ref 0.6–4.47)
LYMPHOCYTES NFR BLD AUTO: 21 % (ref 14–44)
MCH RBC QN AUTO: 30.3 PG (ref 26.8–34.3)
MCHC RBC AUTO-ENTMCNC: 31.1 G/DL (ref 31.4–37.4)
MCV RBC AUTO: 97 FL (ref 82–98)
MONOCYTES # BLD AUTO: 1.21 THOUSAND/ΜL (ref 0.17–1.22)
MONOCYTES NFR BLD AUTO: 11 % (ref 4–12)
NEUTROPHILS # BLD AUTO: 7.29 THOUSANDS/ΜL (ref 1.85–7.62)
NEUTS SEG NFR BLD AUTO: 64 % (ref 43–75)
NRBC BLD AUTO-RTO: 0 /100 WBCS
P AXIS: 72 DEGREES
PLATELET # BLD AUTO: 221 THOUSANDS/UL (ref 149–390)
PMV BLD AUTO: 10.5 FL (ref 8.9–12.7)
POTASSIUM SERPL-SCNC: 4.1 MMOL/L (ref 3.5–5.3)
PR INTERVAL: 158 MS
PROT SERPL-MCNC: 7.5 G/DL (ref 6.4–8.2)
QRS AXIS: 11 DEGREES
QRSD INTERVAL: 76 MS
QT INTERVAL: 370 MS
QTC INTERVAL: 424 MS
RBC # BLD AUTO: 4.99 MILLION/UL (ref 3.81–5.12)
SODIUM SERPL-SCNC: 139 MMOL/L (ref 136–145)
T WAVE AXIS: 49 DEGREES
VENTRICULAR RATE: 79 BPM
WBC # BLD AUTO: 11.22 THOUSAND/UL (ref 4.31–10.16)

## 2021-01-13 PROCEDURE — 85025 COMPLETE CBC W/AUTO DIFF WBC: CPT

## 2021-01-13 PROCEDURE — 36415 COLL VENOUS BLD VENIPUNCTURE: CPT

## 2021-01-13 PROCEDURE — 85652 RBC SED RATE AUTOMATED: CPT

## 2021-01-13 PROCEDURE — 80053 COMPREHEN METABOLIC PANEL: CPT

## 2021-01-13 PROCEDURE — 93005 ELECTROCARDIOGRAM TRACING: CPT

## 2021-01-13 PROCEDURE — 93010 ELECTROCARDIOGRAM REPORT: CPT | Performed by: INTERNAL MEDICINE

## 2021-01-26 ENCOUNTER — TRANSCRIBE ORDERS (OUTPATIENT)
Dept: LAB | Facility: CLINIC | Age: 66
End: 2021-01-26

## 2021-01-26 ENCOUNTER — APPOINTMENT (OUTPATIENT)
Dept: LAB | Facility: CLINIC | Age: 66
End: 2021-01-26
Payer: COMMERCIAL

## 2021-01-26 DIAGNOSIS — E78.2 MIXED HYPERLIPIDEMIA: ICD-10-CM

## 2021-01-26 DIAGNOSIS — E83.51 HYPOCALCEMIA: ICD-10-CM

## 2021-01-26 DIAGNOSIS — E55.9 VITAMIN D DEFICIENCY DISEASE: ICD-10-CM

## 2021-01-26 DIAGNOSIS — D72.829 LEUKOCYTOSIS, UNSPECIFIED TYPE: ICD-10-CM

## 2021-01-26 DIAGNOSIS — E83.51 HYPOCALCEMIA: Primary | ICD-10-CM

## 2021-01-26 DIAGNOSIS — E83.52 HYPERCALCEMIA: ICD-10-CM

## 2021-01-26 DIAGNOSIS — R73.01 IMPAIRED FASTING GLUCOSE: ICD-10-CM

## 2021-01-26 LAB
25(OH)D3 SERPL-MCNC: 42.9 NG/ML (ref 30–100)
ALBUMIN SERPL BCP-MCNC: 3.3 G/DL (ref 3.5–5)
ALP SERPL-CCNC: 115 U/L (ref 46–116)
ALT SERPL W P-5'-P-CCNC: 30 U/L (ref 12–78)
ANION GAP SERPL CALCULATED.3IONS-SCNC: 10 MMOL/L (ref 4–13)
AST SERPL W P-5'-P-CCNC: 19 U/L (ref 5–45)
BASOPHILS # BLD AUTO: 0.05 THOUSANDS/ΜL (ref 0–0.1)
BASOPHILS NFR BLD AUTO: 1 % (ref 0–1)
BILIRUB SERPL-MCNC: 0.32 MG/DL (ref 0.2–1)
BUN SERPL-MCNC: 26 MG/DL (ref 5–25)
CALCIUM ALBUM COR SERPL-MCNC: 9.3 MG/DL (ref 8.3–10.1)
CALCIUM SERPL-MCNC: 8.7 MG/DL (ref 8.3–10.1)
CHLORIDE SERPL-SCNC: 101 MMOL/L (ref 100–108)
CHOLEST SERPL-MCNC: 233 MG/DL (ref 50–200)
CO2 SERPL-SCNC: 27 MMOL/L (ref 21–32)
CREAT SERPL-MCNC: 0.85 MG/DL (ref 0.6–1.3)
EOSINOPHIL # BLD AUTO: 0.13 THOUSAND/ΜL (ref 0–0.61)
EOSINOPHIL NFR BLD AUTO: 1 % (ref 0–6)
ERYTHROCYTE [DISTWIDTH] IN BLOOD BY AUTOMATED COUNT: 13.3 % (ref 11.6–15.1)
GFR SERPL CREATININE-BSD FRML MDRD: 71 ML/MIN/1.73SQ M
GLUCOSE P FAST SERPL-MCNC: 112 MG/DL (ref 65–99)
HCT VFR BLD AUTO: 47 % (ref 34.8–46.1)
HDLC SERPL-MCNC: 40 MG/DL
HGB BLD-MCNC: 15.3 G/DL (ref 11.5–15.4)
IMM GRANULOCYTES # BLD AUTO: 0.07 THOUSAND/UL (ref 0–0.2)
IMM GRANULOCYTES NFR BLD AUTO: 1 % (ref 0–2)
LDLC SERPL CALC-MCNC: 163 MG/DL (ref 0–100)
LYMPHOCYTES # BLD AUTO: 1.68 THOUSANDS/ΜL (ref 0.6–4.47)
LYMPHOCYTES NFR BLD AUTO: 17 % (ref 14–44)
MCH RBC QN AUTO: 30.7 PG (ref 26.8–34.3)
MCHC RBC AUTO-ENTMCNC: 32.6 G/DL (ref 31.4–37.4)
MCV RBC AUTO: 94 FL (ref 82–98)
MONOCYTES # BLD AUTO: 0.86 THOUSAND/ΜL (ref 0.17–1.22)
MONOCYTES NFR BLD AUTO: 9 % (ref 4–12)
NEUTROPHILS # BLD AUTO: 7.02 THOUSANDS/ΜL (ref 1.85–7.62)
NEUTS SEG NFR BLD AUTO: 71 % (ref 43–75)
NONHDLC SERPL-MCNC: 193 MG/DL
NRBC BLD AUTO-RTO: 0 /100 WBCS
PLATELET # BLD AUTO: 222 THOUSANDS/UL (ref 149–390)
PMV BLD AUTO: 10.1 FL (ref 8.9–12.7)
POTASSIUM SERPL-SCNC: 4 MMOL/L (ref 3.5–5.3)
PROT SERPL-MCNC: 7.4 G/DL (ref 6.4–8.2)
PTH-INTACT SERPL-MCNC: 55 PG/ML (ref 18.4–80.1)
RBC # BLD AUTO: 4.98 MILLION/UL (ref 3.81–5.12)
SODIUM SERPL-SCNC: 138 MMOL/L (ref 136–145)
TRIGL SERPL-MCNC: 152 MG/DL
WBC # BLD AUTO: 9.81 THOUSAND/UL (ref 4.31–10.16)

## 2021-01-26 PROCEDURE — 85025 COMPLETE CBC W/AUTO DIFF WBC: CPT

## 2021-01-26 PROCEDURE — 83036 HEMOGLOBIN GLYCOSYLATED A1C: CPT | Performed by: INTERNAL MEDICINE

## 2021-01-26 PROCEDURE — 82306 VITAMIN D 25 HYDROXY: CPT

## 2021-01-26 PROCEDURE — 80053 COMPREHEN METABOLIC PANEL: CPT

## 2021-01-26 PROCEDURE — 80061 LIPID PANEL: CPT

## 2021-01-26 PROCEDURE — 83970 ASSAY OF PARATHORMONE: CPT

## 2021-01-26 PROCEDURE — 36415 COLL VENOUS BLD VENIPUNCTURE: CPT

## 2021-02-01 LAB
EST. AVERAGE GLUCOSE BLD GHB EST-MCNC: 117 MG/DL
HBA1C MFR BLD: 5.7 %

## 2021-02-20 ENCOUNTER — OFFICE VISIT (OUTPATIENT)
Dept: OBGYN CLINIC | Facility: HOSPITAL | Age: 66
End: 2021-02-20
Payer: COMMERCIAL

## 2021-02-20 ENCOUNTER — HOSPITAL ENCOUNTER (OUTPATIENT)
Dept: RADIOLOGY | Facility: HOSPITAL | Age: 66
Discharge: HOME | End: 2021-02-20
Payer: COMMERCIAL

## 2021-02-20 VITALS
HEART RATE: 83 BPM | WEIGHT: 255 LBS | SYSTOLIC BLOOD PRESSURE: 126 MMHG | HEIGHT: 64 IN | BODY MASS INDEX: 43.54 KG/M2 | DIASTOLIC BLOOD PRESSURE: 75 MMHG

## 2021-02-20 DIAGNOSIS — M17.12 PRIMARY OSTEOARTHRITIS OF LEFT KNEE: ICD-10-CM

## 2021-02-20 DIAGNOSIS — M25.561 CHRONIC PAIN OF BOTH KNEES: ICD-10-CM

## 2021-02-20 DIAGNOSIS — M25.561 ACUTE PAIN OF RIGHT KNEE: ICD-10-CM

## 2021-02-20 DIAGNOSIS — M25.562 CHRONIC PAIN OF BOTH KNEES: ICD-10-CM

## 2021-02-20 DIAGNOSIS — M17.11 PRIMARY OSTEOARTHRITIS OF RIGHT KNEE: ICD-10-CM

## 2021-02-20 DIAGNOSIS — M25.562 CHRONIC PAIN OF BOTH KNEES: Primary | ICD-10-CM

## 2021-02-20 DIAGNOSIS — G89.29 CHRONIC PAIN OF BOTH KNEES: ICD-10-CM

## 2021-02-20 DIAGNOSIS — M25.561 CHRONIC PAIN OF BOTH KNEES: Primary | ICD-10-CM

## 2021-02-20 DIAGNOSIS — G89.29 CHRONIC PAIN OF BOTH KNEES: Primary | ICD-10-CM

## 2021-02-20 PROCEDURE — 73562 X-RAY EXAM OF KNEE 3: CPT

## 2021-02-20 PROCEDURE — 20610 DRAIN/INJ JOINT/BURSA W/O US: CPT | Performed by: PHYSICIAN ASSISTANT

## 2021-02-20 PROCEDURE — 99203 OFFICE O/P NEW LOW 30 MIN: CPT | Performed by: PHYSICIAN ASSISTANT

## 2021-02-20 RX ORDER — TRIAMCINOLONE ACETONIDE 40 MG/ML
80 INJECTION, SUSPENSION INTRA-ARTICULAR; INTRAMUSCULAR
Status: COMPLETED | OUTPATIENT
Start: 2021-02-20 | End: 2021-02-20

## 2021-02-20 RX ORDER — IBUPROFEN 200 MG
600 TABLET ORAL 3 TIMES DAILY
COMMUNITY
End: 2021-03-15 | Stop reason: ALTCHOICE

## 2021-02-20 RX ORDER — BILBERRY 100 MG
CAPSULE ORAL
COMMUNITY
Start: 2021-02-15

## 2021-02-20 RX ORDER — LIDOCAINE HYDROCHLORIDE 10 MG/ML
2 INJECTION, SOLUTION INFILTRATION; PERINEURAL
Status: COMPLETED | OUTPATIENT
Start: 2021-02-20 | End: 2021-02-20

## 2021-02-20 RX ORDER — BUPIVACAINE HYDROCHLORIDE 5 MG/ML
2 INJECTION, SOLUTION EPIDURAL; INTRACAUDAL
Status: COMPLETED | OUTPATIENT
Start: 2021-02-20 | End: 2021-02-20

## 2021-02-20 RX ORDER — BIMATOPROST 0.3 MG/ML
1 SOLUTION/ DROPS OPHTHALMIC DAILY
COMMUNITY

## 2021-02-20 RX ORDER — PARSLEY 450 MG
CAPSULE ORAL
COMMUNITY
Start: 2021-02-15

## 2021-02-20 RX ORDER — GABAPENTIN 600 MG/1
600 TABLET ORAL 3 TIMES DAILY
COMMUNITY

## 2021-02-20 RX ADMIN — LIDOCAINE HYDROCHLORIDE 2 ML: 10 INJECTION, SOLUTION INFILTRATION; PERINEURAL at 12:13

## 2021-02-20 RX ADMIN — BUPIVACAINE HYDROCHLORIDE 2 ML: 5 INJECTION, SOLUTION EPIDURAL; INTRACAUDAL at 12:13

## 2021-02-20 RX ADMIN — TRIAMCINOLONE ACETONIDE 80 MG: 40 INJECTION, SUSPENSION INTRA-ARTICULAR; INTRAMUSCULAR at 12:13

## 2021-02-20 NOTE — PROGRESS NOTES
Assessment/Plan   Diagnoses and all orders for this visit:    Chronic pain of both knees    B/L osteoarthritis knees    - b/l cortisone today  - Follow up with Dr Conchis Rodríguez in the next few weeks to discuss TKA's              Subjective   Patient ID: Marcellus Knight is a 77 y o  female  Vitals:    21 1120   BP: 126/75   Pulse: 80     66yo female comes in for an evaluation of her b/l knees  She has gerda having significant, b/l knee pain from arthritis for many years  She has tried cortisone and visco   She wants to schedule b/l TKA's but was not happy with her care at [facility]  She wants to schedule with Dr Conchis Rodríguez  The pain is dull in character, mild in severity, pain does not radiate and is not associated with numbness  The following portions of the patient's history were reviewed and updated as appropriate: allergies, current medications, past family history, past medical history, past social history, past surgical history and problem list     Review of Systems  Ortho Exam  Past Medical History:   Diagnosis Date    Fibromyalgia, primary     Graves disease     Hyperlipidemia     Vitamin D deficiency      Past Surgical History:   Procedure Laterality Date    CATARACT EXTRACTION Bilateral     SHOULDER ARTHROTOMY      TONSILLECTOMY AND ADENOIDECTOMY       History reviewed  No pertinent family history  Social History     Occupational History    Not on file   Tobacco Use    Smoking status: Former Smoker     Quit date:      Years since quittin     Smokeless tobacco: Never Used   Substance and Sexual Activity    Alcohol Use     Binge frequency: Less than monthly    Drug use: Never    Sexual activity: Not on file       Review of Systems   Constitutional: Negative  HENT: Negative  Eyes: Negative  Respiratory: Negative  Cardiovascular: Negative  Gastrointestinal: Negative  Endocrine: Negative  Genitourinary: Negative  Musculoskeletal: As below  Tony Rose Allergic/Immunologic: Negative  Neurological: Negative  Hematological: Negative  Psychiatric/Behavioral: Negative  Objective   Physical Exam      · Constitutional: Awake, Alert, Oriented  · Eyes: EOMI  · Psych: Mood and affect appropriate  · Heart: regular rate and rhythm  · Lungs: No audible wheezing  · Abdomen: soft  · Lymph: no lymphedema   bilateral Knee:  - Appearance   No swelling, discoloration, deformity, or ecchymosis  - Effusion   none  - Palpation   + Tenderness medial joint line , + Tenderness lateral joint line  and No tenderness about the  patella, patellar tendon, MCL, LCL, hamstrings, or medial / lateral tibial plateau   - ROM   Extension: 10 and Flexion: 90  - Special Tests   Prince's Test negative, Anterior Drawer Test negative, Posterior Drawer Test negative, Valgus Stress Test negative and Varus Stress Test negative  - Motor   normal 5/5 in all planes  - NVI distally    I have personally reviewed pertinent films in PACS and my interpretation is significant arthritis with bone on bone joint spaces  Large joint arthrocentesis: bilateral knee  Universal Protocol:  Consent: Verbal consent obtained  Risks and benefits: risks, benefits and alternatives were discussed  Consent given by: patient  Time out: Immediately prior to procedure a "time out" was called to verify the correct patient, procedure, equipment, support staff and site/side marked as required    Timeout called at: 2/20/2021 12:11 PM   Patient understanding: patient states understanding of the procedure being performed  Site marked: the operative site was marked  Patient identity confirmed: verbally with patient    Supporting Documentation  Indications: pain   Procedure Details  Location: knee - bilateral knee  Needle size: 22 G  Ultrasound guidance: no  Approach: lateral    Medications (Right): 2 mL bupivacaine (PF) 0 5 %; 2 mL lidocaine 1 %; 80 mg triamcinolone acetonide 40 mg/mLMedications (Left): 2 mL bupivacaine (PF) 0 5 %; 2 mL lidocaine 1 %; 80 mg triamcinolone acetonide 40 mg/mL   Patient tolerance: patient tolerated the procedure well with no immediate complications  Dressing:  Sterile dressing applied

## 2021-02-23 ENCOUNTER — OFFICE VISIT (OUTPATIENT)
Dept: OBGYN CLINIC | Facility: HOSPITAL | Age: 66
End: 2021-02-23
Payer: COMMERCIAL

## 2021-02-23 VITALS
SYSTOLIC BLOOD PRESSURE: 146 MMHG | BODY MASS INDEX: 42.85 KG/M2 | HEART RATE: 80 BPM | HEIGHT: 64 IN | DIASTOLIC BLOOD PRESSURE: 79 MMHG | WEIGHT: 251 LBS

## 2021-02-23 DIAGNOSIS — Z01.818 PREOP TESTING: ICD-10-CM

## 2021-02-23 DIAGNOSIS — M25.562 CHRONIC PAIN OF BOTH KNEES: ICD-10-CM

## 2021-02-23 DIAGNOSIS — M17.0 PRIMARY OSTEOARTHRITIS OF BOTH KNEES: ICD-10-CM

## 2021-02-23 DIAGNOSIS — G89.29 CHRONIC PAIN OF BOTH KNEES: ICD-10-CM

## 2021-02-23 DIAGNOSIS — M25.561 CHRONIC PAIN OF BOTH KNEES: ICD-10-CM

## 2021-02-23 DIAGNOSIS — M17.11 PRIMARY OSTEOARTHRITIS OF RIGHT KNEE: Primary | ICD-10-CM

## 2021-02-23 PROCEDURE — 99214 OFFICE O/P EST MOD 30 MIN: CPT | Performed by: ORTHOPAEDIC SURGERY

## 2021-02-23 RX ORDER — ASCORBIC ACID 500 MG
500 TABLET ORAL DAILY
Qty: 60 TABLET | Refills: 0 | Status: SHIPPED | OUTPATIENT
Start: 2021-02-23

## 2021-02-23 RX ORDER — FOLIC ACID 1 MG/1
1 TABLET ORAL DAILY
Qty: 30 TABLET | Refills: 0 | Status: SHIPPED | OUTPATIENT
Start: 2021-02-23

## 2021-02-23 RX ORDER — ASPIRIN 81 MG/1
81 TABLET ORAL DAILY
Qty: 56 TABLET | Refills: 0 | Status: SHIPPED | OUTPATIENT
Start: 2021-02-23

## 2021-02-23 RX ORDER — SODIUM CHLORIDE 9 MG/ML
125 INJECTION, SOLUTION INTRAVENOUS CONTINUOUS
Status: CANCELLED | OUTPATIENT
Start: 2021-02-23

## 2021-02-23 RX ORDER — CEFAZOLIN SODIUM 2 G/50ML
2000 SOLUTION INTRAVENOUS ONCE
Status: CANCELLED | OUTPATIENT
Start: 2021-02-23 | End: 2021-02-23

## 2021-02-23 RX ORDER — CHLORHEXIDINE GLUCONATE 4 G/100ML
SOLUTION TOPICAL DAILY PRN
Status: CANCELLED | OUTPATIENT
Start: 2021-02-23

## 2021-02-23 RX ORDER — CHLORHEXIDINE GLUCONATE 0.12 MG/ML
15 RINSE ORAL ONCE
Status: CANCELLED | OUTPATIENT
Start: 2021-02-23 | End: 2021-02-23

## 2021-02-23 RX ORDER — FERROUS SULFATE TAB EC 324 MG (65 MG FE EQUIVALENT) 324 (65 FE) MG
324 TABLET DELAYED RESPONSE ORAL
Qty: 60 TABLET | Refills: 0 | Status: SHIPPED | OUTPATIENT
Start: 2021-02-23

## 2021-02-23 RX ORDER — ACETAMINOPHEN 325 MG/1
975 TABLET ORAL ONCE
Status: CANCELLED | OUTPATIENT
Start: 2021-02-23 | End: 2021-02-23

## 2021-02-23 NOTE — PROGRESS NOTES
Assessment/Plan:  1  Primary osteoarthritis of both knees     2  Chronic pain of both knees     3  BMI 40 0-44 9, adult ([facility] [address] )       Scribe Attestation    I,:  Lucero Stone am acting as a scribe while in the presence of the attending physician :       I,:  Florin Arango MD personally performed the services described in this documentation    as scribed in my presence :         · Ifeanyi Rodríguez is suffering with end-stage osteoarthritis about both knees  The underlying progression of her disease, difficulty with age appropriate activities of daily living and enjoyment, and persistent pain despite conservative treatment indicates her for staged bilateral total knee arthroplasties  She would like to pursue the right knee first    · Discussed the details of total knee arthroplasty procedure and recovery as well as postoperative expectations  · She is currently contraindicated for total knee arthroplasty due to elevated BMI over 40  Discussed risk stratification for elevated BMI  · Discussed weight management strategies and encouraged low-impact and non-impact exercises  · She is otherwise in good health and denies smoking or history of DVT/PE, GI bleeding or peptic ulcer, MRSA infection, diabetes, malignancy  She has an appropriate A1C  · We will consent her for right total knee arthroplasty in anticipation of weight loss which will decrease her BMI to appropriate level  She will present for a weight check 1 week prior  ·  complications include but not limited to infection, bleeding, scarring, nerve injury, vascular injury, continued pain, decreased range of motion, DVT, PE, death, loss of limb, not obtain result patient wished  All these were understood and patient consented for operative intervention for pathology    Subjective:  Initial evaluation for chronic bilateral knee pain    Patient ID: Sissy Voss is a 77 y o  female who presents today for initial evaluation of chronic bilateral knee pain    She states that her knees have been painful for many years  She has received treatment from another surgeon in the past and has received injection therapy  She has received both corticosteroid injection and viscosupplementation injection in the past   She has found benefit from viscosupplementation injection  She was recently evaluated by No Bright who referred her here today  At that visit, bilateral knee corticosteroid injections were administered for pain  At today's visit, she states that these injections were very beneficial and have resolved her knee pain  Prior to the injections, she complained of diffuse activity related pain that has historically been worse on the right over the left  She does use gabapentin as prescribed by her rheumatologist and also uses Motrin and Tylenol for knee pain which provides some benefit  She denies any recent injury or trauma  Review of Systems   Constitutional: Positive for activity change  Negative for chills, fever and unexpected weight change  HENT: Negative for hearing loss, nosebleeds and sore throat  Eyes: Negative for pain, redness and visual disturbance  Respiratory: Negative for cough, shortness of breath and wheezing  Cardiovascular: Negative for chest pain, palpitations and leg swelling  Gastrointestinal: Negative for abdominal pain, nausea and vomiting  Endocrine: Negative for polydipsia and polyuria  Genitourinary: Negative for dysuria and hematuria  Musculoskeletal: Positive for arthralgias, joint swelling and myalgias  See HPI   Skin: Negative for rash and wound  Neurological: Negative for dizziness, numbness and headaches  Psychiatric/Behavioral: Negative for decreased concentration and suicidal ideas  The patient is not nervous/anxious            Past Medical History:   Diagnosis Date    Fibromyalgia, primary     Graves disease     Hyperlipidemia     Vitamin D deficiency        Past Surgical History:   Procedure Laterality Date    CATARACT EXTRACTION Bilateral     SHOULDER ARTHROTOMY      TONSILLECTOMY AND ADENOIDECTOMY         History reviewed  No pertinent family history  Social History     Occupational History    Not on file   Tobacco Use    Smoking status: Former Smoker     Quit date:      Years since quittin 1    Smokeless tobacco: Never Used   Substance and Sexual Activity    Alcohol Use     Binge frequency: Less than monthly    Drug use: Never    Sexual activity: Not on file         Current Outpatient Medications:     Ashwagandha 500 MG CAPS, , Disp: , Rfl:     Bilberry 100 MG CAPS, , Disp: , Rfl:     Cholecalciferol (VITAMIN D) 2000 units CAPS, Take 2 capsules by mouth daily at bedtime , Disp: , Rfl:     Coenzyme Q10-Vitamin E 100-300 MG-UNIT WAFR, , Disp: , Rfl:     ibuprofen (MOTRIN) 200 mg tablet, Take 600 mg by mouth 3 (three) times a day, Disp: , Rfl:     bimatoprost (LUMIGAN) 0 03 % ophthalmic drops, 1 drop daily, Disp: , Rfl:     DULoxetine (CYMBALTA) 60 mg delayed release capsule, Take 20 mg by mouth daily at bedtime, Disp: , Rfl:     gabapentin (NEURONTIN) 400 mg capsule, Take 400 mg by mouth 3 (three) times a day, Disp: , Rfl:     gabapentin (NEURONTIN) 600 MG tablet, Take 600 mg by mouth 3 (three) times a day, Disp: , Rfl:     ibuprofen (MOTRIN) 400 mg tablet, Take by mouth every 6 (six) hours as needed for mild pain, Disp: , Rfl:     simvastatin (ZOCOR) 20 mg tablet, Take 20 mg by mouth daily at bedtime, Disp: , Rfl:     No Known Allergies    Objective:  Vitals:    21 0924   BP: 146/79   Pulse: 80       Body mass index is 43 08 kg/m²  Right Knee Exam     Tenderness   The patient is experiencing tenderness in the medial joint line, lateral joint line and patella  Range of Motion   Right knee extension: 0  Right knee flexion: 110 limited by body habitus       Tests   Varus: negative Valgus: negative  Drawer:  Anterior - negative    Posterior - negative    Other Erythema: absent  Scars: absent  Sensation: normal  Pulse: present  Swelling: none  Effusion: no effusion present    Comments:  Stable at 0, 30 90°  Neurovascular intact distally  No, erythema or signs of infection  Parapatellar crepitance noted  Patellofemoral grind:  Positive      Left Knee Exam     Tenderness   The patient is experiencing tenderness in the lateral joint line, medial joint line and patella  Range of Motion   Left knee extension: 0  Left knee flexion: 110 limited by body habitus  Tests   Varus: negative Valgus: negative  Drawer:  Anterior - negative     Posterior - negative    Other   Erythema: absent  Scars: absent  Sensation: normal  Pulse: present  Swelling: none  Effusion: no effusion present    Comments:  Stable at 0, 30 90°  Neurovascular intact distally  No, erythema or signs of infection  Parapatellar crepitance noted  Patellofemoral grind:  Positive          Observations   Left Knee   Negative for effusion  Right Knee   Negative for effusion  Physical Exam  Vitals signs and nursing note reviewed  Constitutional:       Appearance: She is well-developed  HENT:      Head: Normocephalic and atraumatic  Eyes:      General: No scleral icterus  Conjunctiva/sclera: Conjunctivae normal    Neck:      Musculoskeletal: Normal range of motion and neck supple  Cardiovascular:      Rate and Rhythm: Normal rate  Pulmonary:      Effort: Pulmonary effort is normal  No respiratory distress  Musculoskeletal:      Right knee: She exhibits no effusion  Left knee: She exhibits no effusion  Comments: As noted in HPI   Skin:     General: Skin is warm and dry  Neurological:      Mental Status: She is alert and oriented to person, place, and time  Psychiatric:         Behavior: Behavior normal          I have personally reviewed pertinent films in PACS      Bilateral knee x-rays obtained on 02/20/2021 reviewed demonstrating severe end-stage degenerative change as evidenced by joint space narrowing with bone-on-bone contact in the medial and lateral compartments, sclerosis, marginal osteophytosis, and tibial subluxation  There is no acute fracture, dislocation, lytic or blastic lesion

## 2021-02-23 NOTE — PATIENT INSTRUCTIONS
Core Strengthening Exercises   WHAT YOU NEED TO KNOW:   Your core includes the muscles of your lower back, hip, pelvis, and abdomen  Core strengthening exercises help heal and strengthen these muscles  This helps prevent another injury, and keeps your pelvis, spine, and hips in the correct position  DISCHARGE INSTRUCTIONS:   Contact your healthcare provider if:   · You have sharp or worsening pain during exercise or at rest     · You have questions or concerns about your shoulder exercises  Safety tips:  Talk to your healthcare provider before you start an exercise program  A physical therapist can teach you how to do core strengthening exercises safely  · Do the exercises on a mat or firm surface  A firm surface will support your spine and prevent low back pain  Do not do these exercises on a bed  · Move slowly and smoothly  Avoid fast or jerky motions  · Stop if you feel pain  Core exercises should not be painful  Stop if you feel pain  · Breathe normally during core exercises  Do not hold your breath  This may cause an increase in blood pressure and prevent muscle strengthening  Your healthcare provider will tell you when to inhale and exhale during the exercise  · Begin all of your exercises with abdominal bracing  Abdominal bracing helps warm up your core muscles  You can also practice abdominal bracing throughout the day  Lie on your back with your knees bent and feet flat on the floor  Place your arms in a relaxed position beside your body  Tighten your abdominal muscles  Pull your belly button in and up toward your spine  Hold for 5 seconds  Relax your muscles  Repeat 10 times  Core strengthening exercises: Your healthcare provider will tell you how often to do these exercises  The provider will also tell you how many repetitions of each exercise you should do  Hold each exercise for 5 seconds or as directed  As you get stronger, increase your hold to 10 to 15 seconds   You can do some of these exercises on a stability ball, or with a weight  Ask your healthcare provider how to use a stability ball or weight for these exercises:  · Bridging:  Lie on your back with your knees bent and feet flat on the floor  Rest your arms at your side  Tighten your buttocks, and then lift your hips 1 inch off the floor  Hold for 5 seconds  When you can do this exercise without pain for 10 seconds, increase the distance you lift your hips  A good goal is to be able to lift your hips so that your shoulders, hips, and knees are in a straight line  · Dead bug:  Lie on your back with your knees bent and feet flat on the floor  Place your arms in a relaxed position beside your body  Begin with abdominal bracing  Next, raise one leg, keeping your knee bent  Hold for 5 seconds  Repeat with the other leg  When you can do this exercise without pain for 10 to 15 seconds, you may raise one straight leg and hold  Repeat with the other leg  · Quadruped:  Place your hands and knees on the floor  Keep your wrists directly below your shoulders and your knees directly below your hips  Pull your belly button in toward your spine  Do not flatten or arch your back  Tighten your abdominal muscles below your belly button  Hold for 5 seconds  When you can do this exercise without pain for 10 to 15 seconds, you may extend one arm and hold  Repeat on the other side  · Side bridge exercises:      ? Standing side bridge:  Stand next to a wall and extend one arm toward the wall  Place your palm flat on the wall with your fingers pointing upward  Begin with abdominal bracing  Next, without moving your feet, slowly bend your arm to 90 degrees  Hold for 5 seconds  Repeat on the other side  When you can do this exercise without pain for 10 to 15 seconds, you may do the bent leg side bridge on the floor  ? Bent leg side bridge:  Lie on one side with your legs, hips, and shoulders in a straight line   Prop yourself up onto your forearm so your elbow is directly below your shoulder  Bend your knees back to 90 degrees  Begin with abdominal bracing  Next, lift your hips and balance yourself on your forearm and knees  Hold for 5 seconds  Repeat on the other side  When you can do this exercise without pain for 10 to 15 seconds, you may do the straight leg side bridge on the floor  ? Straight leg side bridge:  Lie on one side with your legs, hips, and shoulders in a straight line  Prop yourself up onto your forearm so your elbow is directly below your shoulder  Begin with abdominal bracing  Lift your hips off the floor and balance yourself on your forearm and the outside of your flexed foot  Do not let your ankle bend sideways  Hold for 5 seconds  Repeat on the other side  When you can do this exercise without pain for 10 to 15 seconds, ask your healthcare provider for more advanced exercises  · Superman:  Lie on your stomach  Extend your arms forward on the floor  Tighten your abdominal muscles and lift your right hand and left leg off the floor  Hold this position  Slowly return to the starting position  Tighten your abdominal muscles and lift your left hand and right leg off the floor  Hold this position  Slowly return to the starting position  · Clam:  Lie on your side with your knees bent  Put your bottom arm under your head to keep your neck in line  Put your top hand on your hip to keep your pelvis from moving  Put your heels together, and keep them together during this exercise  Slowly raise your top knee toward the ceiling  Then lower your leg so your knees are together  Repeat this exercise 10 times  Then switch sides and do the exercise 10 times with the other leg  · Curl up:  Lie on your back with your knees bent and feet flat on the floor  Place your hands, palms down, underneath your lower back   Next, with your elbows on the floor, lift your shoulders and chest 2 to 3 inches off the floor  Keep your head in line with your shoulders  Hold this position  Slowly return to the starting position  · Straight leg raises:  Lie on your back with one leg straight  Bend the other knee and place your foot flat on the floor  Tighten your abdominal muscles  Keep your leg straight and slowly lift it straight up 6 to 12 inches off the floor  Hold this position  Lower your leg slowly  Do as many repetitions as directed on this side  Repeat with the other leg  · Plank:  Lie on your stomach  Bend your elbows and place your forearms flat on the floor  Lift your chest, stomach, and knees off the floor  Make sure your elbows are below your shoulders  Your body should be in a straight line  Do not let your hips or lower back sink to the ground  Squeeze your abdominal muscles together and hold for 15 seconds  To make this exercise harder, hold for 30 seconds or lift 1 leg at a time  · Bicycles:  Lie on your back  Bend both knees and bring them toward your chest  Your calves should be parallel to the floor  Place the palms of your hands on the back of your head  Straighten your right leg and keep it lifted 2 inches off the floor  Raise your head and shoulders off the floor and twist towards your left  Keep your head and shoulders lifted  Bend your right knee while you straighten your left leg  Keep your left leg 2 inches off the floor  Twist your head and chest towards the left leg  Continue to straighten 1 leg at a time and twist        Follow up with your healthcare provider as directed:  Write down your questions so you remember to ask them during your visits  © Copyright [address] Information is for End User's use only and may not be sold, redistributed or otherwise used for commercial purposes  All illustrations and images included in CareNotes® are the copyrighted property of [employer]  or [address] [place], [place] [address]linda Chanel   The above information is an  only   It is not intended as medical advice for individual conditions or treatments  Talk to your doctor, nurse or pharmacist before following any medical regimen to see if it is safe and effective for you

## 2021-03-12 ENCOUNTER — TELEPHONE (OUTPATIENT)
Dept: OBGYN CLINIC | Facility: HOSPITAL | Age: 66
End: 2021-03-12

## 2021-03-12 NOTE — TELEPHONE ENCOUNTER
Patient sees Dr Brittany Link    Patient is calling stating she will be in today to  [facility] Paperwork  She is requesting the paperwork be faxed to the # provided on the paperwork and she would like to  the fax confirmation sheet in addition to the [facility]      Call back # [phone]

## 2021-03-14 ENCOUNTER — TELEPHONE (OUTPATIENT)
Dept: OTHER | Facility: OTHER | Age: 66
End: 2021-03-14

## 2021-03-14 NOTE — TELEPHONE ENCOUNTER
From my right knee on the outside, there is pain radiating up to my buttock  Please give me a call to discuss

## 2021-03-15 ENCOUNTER — OFFICE VISIT (OUTPATIENT)
Dept: OBGYN CLINIC | Facility: HOSPITAL | Age: 66
End: 2021-03-15
Payer: COMMERCIAL

## 2021-03-15 VITALS
HEART RATE: 90 BPM | DIASTOLIC BLOOD PRESSURE: 80 MMHG | HEIGHT: 64 IN | SYSTOLIC BLOOD PRESSURE: 145 MMHG | BODY MASS INDEX: 42.27 KG/M2 | WEIGHT: 247.6 LBS

## 2021-03-15 DIAGNOSIS — M54.31 SCIATICA OF RIGHT SIDE: Primary | ICD-10-CM

## 2021-03-15 PROCEDURE — 99213 OFFICE O/P EST LOW 20 MIN: CPT | Performed by: PHYSICIAN ASSISTANT

## 2021-03-15 RX ORDER — DICLOFENAC SODIUM 75 MG/1
75 TABLET, DELAYED RELEASE ORAL 2 TIMES DAILY
Qty: 30 TABLET | Refills: 0 | Status: SHIPPED | OUTPATIENT
Start: 2021-03-15

## 2021-03-15 NOTE — PROGRESS NOTES
Assessment/Plan   Diagnoses and all orders for this visit:    Sciatica of right side    - Change from otc ibuprofen to rx diclofenac  - Start formal PT instead of water aerobics  - I held off on any steroids because of her upcoming TKA  - Consider going back up on the Gabapentin if no improvement with above  - Follow up with Dr Elba Pompa as scheduled  - Call with any new or worsening symptoms      Subjective   Patient ID: Bean Hurt is a 77 y o  female  Vitals:    03/15/21 1800   BP: 145/80   Pulse: 80     66yo female comes in for an evaluation of her right leg  She is a patient of Dr Elba Pompa who is scheduled for a right TKA next month  She last had b/l cortisone injections on 21  She called last week c/o pain in the right thigh  This began after staring a water aerobics program   The pain extends from the SI area down to the foot  No new injury  No numbness or weakness  She has also been tapering her Gabapentin as per her rheumatologist's plan  The following portions of the patient's history were reviewed and updated as appropriate: allergies, current medications, past family history, past medical history, past social history, past surgical history and problem list     Review of Systems  Ortho Exam  Past Medical History:   Diagnosis Date    Fibromyalgia, primary     Graves disease     Hyperlipidemia     Vitamin D deficiency      Past Surgical History:   Procedure Laterality Date    CATARACT EXTRACTION Bilateral     SHOULDER ARTHROTOMY      TONSILLECTOMY AND ADENOIDECTOMY       History reviewed  No pertinent family history    Social History     Occupational History    Not on file   Tobacco Use    Smoking status: Former Smoker     Quit date:      Years since quittin 2    Smokeless tobacco: Never Used   Substance and Sexual Activity    Alcohol Use     Binge frequency: Less than monthly    Drug use: Never    Sexual activity: Not on file       Review of Systems   Constitutional: Negative  HENT: Negative  Eyes: Negative  Respiratory: Negative  Cardiovascular: Negative  Gastrointestinal: Negative  Endocrine: Negative  Genitourinary: Negative  Musculoskeletal: As below      Allergic/Immunologic: Negative  Neurological: Negative  Hematological: Negative  Psychiatric/Behavioral: Negative  Objective   Physical Exam    · Constitutional: Awake, Alert, Oriented  · Eyes: EOMI  · Psych: Mood and affect appropriate  · Heart: regular rate and rhythm  · Lungs: No audible wheezing  · Abdomen: soft  · Lymph: no lymphedema   right Knee:  - Appearance   No swelling, discoloration, deformity, or ecchymosis  - Effusion   none  - Palpation   + Tenderness medial joint line    No tenderness of the patella, patellar tendon, pes anserine, lateral joint line, MCL, LCL, medial / lateral plateau  - ROM   Extension: 5 and Flexion: 100  - Special Tests   + straight leg raise   Anterior Drawer Test negative, Posterior Drawer Test negative, Valgus Stress Test negative and Varus Stress Test negative  - Motor   normal 5/5 in all planes  - NVI distally

## 2021-03-15 NOTE — TELEPHONE ENCOUNTER
Forms were completed/ scanned and Faxed  Please check the Disability Log for Status and Media Tab for Forms   Thank you

## 2021-03-30 ENCOUNTER — TELEPHONE (OUTPATIENT)
Dept: OBGYN CLINIC | Facility: HOSPITAL | Age: 66
End: 2021-03-30

## 2021-03-30 NOTE — TELEPHONE ENCOUNTER
Dr Taz aTo    [phone]    Patient needs to cancel her surgery on 4/26  She needs to take care of back issues  She will call back to reschedule

## 2021-04-05 ENCOUNTER — TRANSCRIBE ORDERS (OUTPATIENT)
Dept: LAB | Facility: CLINIC | Age: 66
End: 2021-04-05

## 2021-04-05 ENCOUNTER — APPOINTMENT (OUTPATIENT)
Dept: LAB | Facility: CLINIC | Age: 66
End: 2021-04-05
Payer: COMMERCIAL

## 2021-04-05 DIAGNOSIS — E83.51 HYPOCALCEMIA: Primary | ICD-10-CM

## 2021-04-05 DIAGNOSIS — E83.51 HYPOCALCEMIA: ICD-10-CM

## 2021-04-05 LAB
ALBUMIN SERPL BCP-MCNC: 3 G/DL (ref 3.5–5)
ALP SERPL-CCNC: 146 U/L (ref 46–116)
ALT SERPL W P-5'-P-CCNC: 89 U/L (ref 12–78)
ANION GAP SERPL CALCULATED.3IONS-SCNC: 7 MMOL/L (ref 4–13)
AST SERPL W P-5'-P-CCNC: 27 U/L (ref 5–45)
BILIRUB SERPL-MCNC: 0.16 MG/DL (ref 0.2–1)
BUN SERPL-MCNC: 29 MG/DL (ref 5–25)
CALCIUM ALBUM COR SERPL-MCNC: 10.1 MG/DL (ref 8.3–10.1)
CALCIUM SERPL-MCNC: 9.3 MG/DL (ref 8.3–10.1)
CHLORIDE SERPL-SCNC: 106 MMOL/L (ref 100–108)
CO2 SERPL-SCNC: 29 MMOL/L (ref 21–32)
CREAT SERPL-MCNC: 0.84 MG/DL (ref 0.6–1.3)
GFR SERPL CREATININE-BSD FRML MDRD: 72 ML/MIN/1.73SQ M
GLUCOSE SERPL-MCNC: 97 MG/DL (ref 65–140)
POTASSIUM SERPL-SCNC: 3.9 MMOL/L (ref 3.5–5.3)
PROT SERPL-MCNC: 7 G/DL (ref 6.4–8.2)
SODIUM SERPL-SCNC: 142 MMOL/L (ref 136–145)

## 2021-04-05 PROCEDURE — 80053 COMPREHEN METABOLIC PANEL: CPT

## 2021-04-05 PROCEDURE — 36415 COLL VENOUS BLD VENIPUNCTURE: CPT

## 2021-04-12 ENCOUNTER — TRANSCRIBE ORDERS (OUTPATIENT)
Dept: ADMINISTRATIVE | Facility: HOSPITAL | Age: 66
End: 2021-04-12

## 2021-04-12 DIAGNOSIS — Z12.31 ENCOUNTER FOR SCREENING MAMMOGRAM FOR MALIGNANT NEOPLASM OF BREAST: Primary | ICD-10-CM

## 2021-04-12 DIAGNOSIS — R94.5 ABNORMAL RESULTS OF LIVER FUNCTION STUDIES: ICD-10-CM

## 2021-04-12 DIAGNOSIS — Z78.0 ASYMPTOMATIC MENOPAUSAL STATE: ICD-10-CM

## 2021-11-13 PROCEDURE — U0005 INFEC AGEN DETEC AMPLI PROBE: HCPCS | Performed by: FAMILY MEDICINE

## 2021-11-13 PROCEDURE — U0003 INFECTIOUS AGENT DETECTION BY NUCLEIC ACID (DNA OR RNA); SEVERE ACUTE RESPIRATORY SYNDROME CORONAVIRUS 2 (SARS-COV-2) (CORONAVIRUS DISEASE [COVID-19]), AMPLIFIED PROBE TECHNIQUE, MAKING USE OF HIGH THROUGHPUT TECHNOLOGIES AS DESCRIBED BY CMS-2020-01-R: HCPCS | Performed by: FAMILY MEDICINE

## 2021-12-13 ENCOUNTER — APPOINTMENT (OUTPATIENT)
Dept: LAB | Facility: CLINIC | Age: 66
End: 2021-12-13
Payer: COMMERCIAL

## 2021-12-13 DIAGNOSIS — Z20.828 EXPOSURE TO SARS-ASSOCIATED CORONAVIRUS: ICD-10-CM

## 2021-12-13 PROCEDURE — 86769 SARS-COV-2 COVID-19 ANTIBODY: CPT

## 2021-12-13 PROCEDURE — 36415 COLL VENOUS BLD VENIPUNCTURE: CPT

## 2021-12-14 LAB — SARS-COV-2 IGG+IGM SERPL QL IA: NORMAL

## 2022-01-14 ENCOUNTER — APPOINTMENT (OUTPATIENT)
Dept: LAB | Facility: CLINIC | Age: 67
End: 2022-01-14
Payer: COMMERCIAL

## 2022-01-14 DIAGNOSIS — E55.9 AVITAMINOSIS D: ICD-10-CM

## 2022-01-14 DIAGNOSIS — E78.2 MIXED HYPERLIPIDEMIA: ICD-10-CM

## 2022-01-14 DIAGNOSIS — R73.01 IMPAIRED FASTING GLUCOSE: ICD-10-CM

## 2022-01-14 LAB
25(OH)D3 SERPL-MCNC: 37.4 NG/ML (ref 30–100)
ALBUMIN SERPL BCP-MCNC: 3.2 G/DL (ref 3.5–5)
ALP SERPL-CCNC: 113 U/L (ref 46–116)
ALT SERPL W P-5'-P-CCNC: 37 U/L (ref 12–78)
ANION GAP SERPL CALCULATED.3IONS-SCNC: 6 MMOL/L (ref 4–13)
AST SERPL W P-5'-P-CCNC: 21 U/L (ref 5–45)
BASOPHILS # BLD AUTO: 0.05 THOUSANDS/ΜL (ref 0–0.1)
BASOPHILS NFR BLD AUTO: 1 % (ref 0–1)
BILIRUB SERPL-MCNC: 0.39 MG/DL (ref 0.2–1)
BUN SERPL-MCNC: 20 MG/DL (ref 5–25)
CALCIUM ALBUM COR SERPL-MCNC: 9.5 MG/DL (ref 8.3–10.1)
CALCIUM SERPL-MCNC: 8.9 MG/DL (ref 8.3–10.1)
CHLORIDE SERPL-SCNC: 106 MMOL/L (ref 100–108)
CHOLEST SERPL-MCNC: 236 MG/DL
CO2 SERPL-SCNC: 28 MMOL/L (ref 21–32)
CREAT SERPL-MCNC: 0.93 MG/DL (ref 0.6–1.3)
EOSINOPHIL # BLD AUTO: 0.1 THOUSAND/ΜL (ref 0–0.61)
EOSINOPHIL NFR BLD AUTO: 1 % (ref 0–6)
ERYTHROCYTE [DISTWIDTH] IN BLOOD BY AUTOMATED COUNT: 14 % (ref 11.6–15.1)
EST. AVERAGE GLUCOSE BLD GHB EST-MCNC: 123 MG/DL
GFR SERPL CREATININE-BSD FRML MDRD: 63 ML/MIN/1.73SQ M
GLUCOSE P FAST SERPL-MCNC: 114 MG/DL (ref 65–99)
HBA1C MFR BLD: 5.9 %
HCT VFR BLD AUTO: 47.9 % (ref 34.8–46.1)
HDLC SERPL-MCNC: 42 MG/DL
HGB BLD-MCNC: 15.4 G/DL (ref 11.5–15.4)
IMM GRANULOCYTES # BLD AUTO: 0.07 THOUSAND/UL (ref 0–0.2)
IMM GRANULOCYTES NFR BLD AUTO: 1 % (ref 0–2)
LDLC SERPL CALC-MCNC: 166 MG/DL (ref 0–100)
LYMPHOCYTES # BLD AUTO: 1.79 THOUSANDS/ΜL (ref 0.6–4.47)
LYMPHOCYTES NFR BLD AUTO: 17 % (ref 14–44)
MCH RBC QN AUTO: 31.4 PG (ref 26.8–34.3)
MCHC RBC AUTO-ENTMCNC: 32.2 G/DL (ref 31.4–37.4)
MCV RBC AUTO: 98 FL (ref 82–98)
MONOCYTES # BLD AUTO: 1.03 THOUSAND/ΜL (ref 0.17–1.22)
MONOCYTES NFR BLD AUTO: 10 % (ref 4–12)
NEUTROPHILS # BLD AUTO: 7.83 THOUSANDS/ΜL (ref 1.85–7.62)
NEUTS SEG NFR BLD AUTO: 70 % (ref 43–75)
NONHDLC SERPL-MCNC: 194 MG/DL
NRBC BLD AUTO-RTO: 0 /100 WBCS
PLATELET # BLD AUTO: 233 THOUSANDS/UL (ref 149–390)
PMV BLD AUTO: 10.1 FL (ref 8.9–12.7)
POTASSIUM SERPL-SCNC: 4 MMOL/L (ref 3.5–5.3)
PROT SERPL-MCNC: 7.5 G/DL (ref 6.4–8.2)
RBC # BLD AUTO: 4.9 MILLION/UL (ref 3.81–5.12)
SODIUM SERPL-SCNC: 140 MMOL/L (ref 136–145)
T4 FREE SERPL-MCNC: 1.1 NG/DL (ref 0.76–1.46)
TRIGL SERPL-MCNC: 142 MG/DL
TSH SERPL DL<=0.05 MIU/L-ACNC: 1.65 UIU/ML (ref 0.36–3.74)
WBC # BLD AUTO: 10.87 THOUSAND/UL (ref 4.31–10.16)

## 2022-01-14 PROCEDURE — 80053 COMPREHEN METABOLIC PANEL: CPT

## 2022-01-14 PROCEDURE — 84439 ASSAY OF FREE THYROXINE: CPT

## 2022-01-14 PROCEDURE — 82306 VITAMIN D 25 HYDROXY: CPT

## 2022-01-14 PROCEDURE — 83036 HEMOGLOBIN GLYCOSYLATED A1C: CPT

## 2022-01-14 PROCEDURE — 36415 COLL VENOUS BLD VENIPUNCTURE: CPT

## 2022-01-14 PROCEDURE — 80061 LIPID PANEL: CPT

## 2022-01-14 PROCEDURE — 85025 COMPLETE CBC W/AUTO DIFF WBC: CPT

## 2022-01-14 PROCEDURE — 84443 ASSAY THYROID STIM HORMONE: CPT

## 2022-03-23 ENCOUNTER — OFFICE VISIT (OUTPATIENT)
Dept: BARIATRICS | Facility: CLINIC | Age: 67
End: 2022-03-23

## 2022-03-23 VITALS — BODY MASS INDEX: 46.28 KG/M2 | HEIGHT: 63 IN | WEIGHT: 261.2 LBS

## 2022-03-23 DIAGNOSIS — E66.01 OBESITY, CLASS III, BMI 40-49.9 (MORBID OBESITY) (HCC): Primary | ICD-10-CM

## 2022-03-23 PROCEDURE — RECHECK

## 2022-03-23 NOTE — PROGRESS NOTES
Bariatric Nutrition Assessment Note    Type of surgery    Preop  Surgery Date: TBD  Surgeon: Undecided    Nutrition Assessment   Suzanne Nunez  74 y o   female     Wt with BMI of 25: 139 lbs  Pre-Op Excess Wt: 122 lbs  Height 5' 2 5" (1 588 m), weight 118 kg (261 lb 3 2 oz)  Body mass index is 47 01 kg/m²      Weight History   Onset of Obesity: Adult  Family history of obesity: No  Wt Loss Attempts: High Protein/Low CHO diets (Atkins, Union, etc )  Nutrition Counseling with DOUG  [facility]  Maximum Wt Lost: 20 lbs    Review of History and Medications   Past Medical History:   Diagnosis Date    Fibromyalgia, primary     Graves disease     Hyperlipidemia     Vitamin D deficiency      Past Surgical History:   Procedure Laterality Date    CATARACT EXTRACTION Bilateral     SHOULDER ARTHROTOMY      TONSILLECTOMY AND ADENOIDECTOMY       Social History     Socioeconomic History    Marital status: /Civil Union     Spouse name: Not on file    Number of children: Not on file    Years of education: Not on file    Highest education level: Not on file   Occupational History    Not on file   Tobacco Use    Smoking status: Former Smoker     Quit date:      Years since quittin 2    Smokeless tobacco: Never Used   Substance and Sexual Activity    Alcohol use: Not on file    Drug use: Never    Sexual activity: Not on file   Other Topics Concern    Not on file   Social History Narrative    Not on file     Social Determinants of Health     Financial Resource Strain: Not on file   Food Insecurity: Not on file   Transportation Needs: Not on file   Physical Activity: Not on file   Stress: Not on file   Social Connections: Not on file   Intimate Partner Violence: Not on file   Housing Stability: Not on file       Current Outpatient Medications:     ascorbic acid (VITAMIN C) 500 MG tablet, Take 1 tablet (500 mg total) by mouth daily, Disp: 60 tablet, Rfl: 0    Ashwagandha 500 MG CAPS, , Disp: , Rfl:     aspirin (ECOTRIN LOW STRENGTH) 81 mg EC tablet, Take 1 tablet (81 mg total) by mouth daily Do not start taking medication until after surgery, Disp: 56 tablet, Rfl: 0    Bilberry 100 MG CAPS, , Disp: , Rfl:     bimatoprost (LUMIGAN) 0 03 % ophthalmic drops, 1 drop daily, Disp: , Rfl:     Cholecalciferol (VITAMIN D) 2000 units CAPS, Take 2 capsules by mouth daily at bedtime , Disp: , Rfl:     Coenzyme Q10-Vitamin E 100-300 MG-UNIT WA, , Disp: , Rfl:     diclofenac (VOLTAREN) 75 mg EC tablet, Take 1 tablet (75 mg total) by mouth 2 (two) times a day, Disp: 30 tablet, Rfl: 0    DULoxetine (CYMBALTA) 60 mg delayed release capsule, Take 20 mg by mouth daily at bedtime, Disp: , Rfl:     ferrous sulfate 324 (65 Fe) mg, Take 1 tablet (324 mg total) by mouth 2 (two) times a day before meals, Disp: 60 tablet, Rfl: 0    folic acid (FOLVITE) 1 mg tablet, Take 1 tablet (1 mg total) by mouth daily, Disp: 30 tablet, Rfl: 0    gabapentin (NEURONTIN) 400 mg capsule, Take 400 mg by mouth 3 (three) times a day, Disp: , Rfl:     gabapentin (NEURONTIN) 600 MG tablet, Take 600 mg by mouth 3 (three) times a day, Disp: , Rfl:     simvastatin (ZOCOR) 20 mg tablet, Take 20 mg by mouth daily at bedtime, Disp: , Rfl:   Food Intake and Lifestyle Assessment   Food Intake Assessment completed via usual diet recall  Breakfast: frittata, toast, or oatmeal or scrambled eggs or leftover chicken breast or chicken sausage  Snack: 0   Lunch: protein, salad, vegetables occ carb  Snack: banana and peanut butter or nuts or hummus and veggies  Dinner: protein, starch, vegetable  Snack: small pc of cake  Beverage intake: water, coffee/tea and [place] cola-4 oz  Protein supplement: 0  Estimated protein intake per day: 65-70g  Estimated fluid intake per day: >90 oz  Meals eaten away from home: once/week  Typical meal pattern: 3 meals per day and 2-3 snacks per day  Eating Behaviors: Large portion sizes and Frequent snacking/ grazing  Food allergies or intolerances: No Known Allergies  Cultural or Rastafarian considerations: N/A    Physical Assessment  Physical Activity  Types of exercise: None  Current physical limitations: knees and pelvic pain    Psychosocial Assessment   Support systems: spouse  Socioeconomic factors: lives with     Nutrition Diagnosis  Diagnosis: Overweight / Obesity (NC-3 3)  Related to: Physical inactivity and Excessive energy intake  As Evidenced by: BMI >25     Nutrition Prescription: Recommend the following diet  Low fat, Low sugar, High protein and Regular    Interventions and Teaching   Discussed pre-op and post-op nutrition guidelines  Patient educated and handouts provided    Surgical changes to stomach / GI  Capacity of post-surgery stomach  Diet progression  Adequate hydration  Sugar and fat restriction to decrease "dumping syndrome"  Fat restriction to decrease steatorrhea  Expected weight loss  Weight loss plateaus/ possibility of weight regain  Exercise  Suggestions for pre-op diet  Nutrition considerations after surgery  Protein supplements  Meal planning and preparation  Appropriate carbohydrate, protein, and fat intake, and food/fluid choices to maximize safe weight loss, nutrient intake, and tolerance   Dietary and lifestyle changes  Possible problems with poor eating habits  Intuitive eating  Techniques for self monitoring and keeping daily food journal  Potential for food intolerance after surgery, and ways to deal with them including: lactose intolerance, nausea, reflux, vomiting, diarrhea, food intolerance, appetite changes, gas  Vitamin / Mineral supplementation of Multivitamin with minerals and vitamin D    Education provided to: patient and     Barriers to learning: Desire/Motivation  Readiness to change: preparation    Prior research on procedure: discussed with provider    Comprehension: verbalizes understanding     Expected Compliance: good  Recommendations  Pt is an appropriate candidate for surgery  Pt unsure if she wants surgery- here for evaluation to hear what it's about      Evaluation / Monitoring  Dietitian to Monitor: Eating pattern as discussed Body weight Lab values    Goals  Eliminate sugar sweetened beverages, Complete lession plans 1-6, Eat 3 meals per day and Eliminate mindless snacking    Time Spent:   1 Hour

## 2022-03-23 NOTE — PROGRESS NOTES
Bariatric Behavioral Health Evaluation    Presenting Problem:  Micah Macias  is a 74 y o    female    :  1955   Patient presented with overall concerns of obesity  Stated that weight has impacted quality of life and concerned with lack of mobility, chronic pain, and overall health  Has attempted various weight loss plans in the past    Patient is Interested in exploring bariatric surgery as an option for  weight loss goals to improve health, increase activity and prevent family disease  Is the patient seeking Bariatric Surgery Eval? Yes    Realizes Post- Op Requirements? Yes  Pre-morbid level of function and history of present illness: Patient has health issues  Psychiatric/Psychological Treatment Diagnosis: Patient reports no Mental Health diagnosis  Outpatient Counselor No     Psychiatrist No    Have you had Inpatient Treatment? Yes In  for a suicide attempt    Risk Assessment: No thoughts of self harm, suicide or homicide  Observation:  this interview only  Access to weapons : Denied    Drug and/or Alcohol treatment history: Denied    Tobacco History: Quit 2020    Domestic Violence No      Abuse History: No Sexual abuse, Physical abuse, Emotional abuse in history  Physical/Psychological Assessment:     Appearance: appropriate  Sociability: average  Affect: appropriate  Mood: calm  Thought Process: coherent  Speech: normal  Content: no impairment  Orientation: person  Yes, place  Yes, time  Yes, normal attention span  Yes, normal memory  Yes   and normal judgement  Yes   Insight: emotional  good       Note :  Patient presented for behavioral health evaluation for the bariatric program  Patient denies Axis I diagnosis and treatment  Patient educated regarding the impact of nicotine and alcohol on the post bariatric surgery patient  Discussed preventing pregnancy for 18 months after bariatric surgery  Patient has a positive family history of obesity  Workflow reviewed  Physical Therapy Daily Treatment Note  Date:  10/1/2018    Patient Name:  Frank Crocker    :  1944  MRN: [id number]  Restrictions/Precautions:    Medical/Treatment Diagnosis Information:   · Diagnosis: R ankle Arthritis  · Treatment Diagnosis: decreased neuromuscular control of the R ankle, decreased balance, and decreased R DF and great toe ROM    Tracking Information:  Physician Information Referring Practitioner: Juli Delgado     Plan of Care Sent Date: 18 Signed Received:    Visit Count / Total Visits  3/12    Insurance Approved Visits  medicare guidelines Approved Dates:     Insurance Information PT Insurance Information: Medicare-primary, [facility]-secondary    Progress Note/G-codes   [x]  Yes  []  No Next Due:      Pain level: 6.5/10     Subjective:  Pt reports that he is feeling okay this date. Nothing new to report since last session. Objective:   Observation:   Test measurements:      Exercises:  Exercise/Equipment Resistance/Repetitions Other comments   bike  : Achilles pain. IB/HR Gastroc 2 x 30\" x 10 HR  Soleus 2 x 30\" x 10 HR    // bars Tandem 2 x 30\" B  wobbleboard DLS A/P x 15, G40 sec hold    4 in heel taps B x 10                                                               Other Therapeutic Activities:  : Patient educated on the focus of skilled physical therapy services and plan of care, including: diagnosis, prognosis, treatment goals & options. Home Exercise Program:  : The following exercises were performed and added to the pt's home program (educated on appropriate frequency, intensity and duration etc.): ankle 4 ways (focusing on neuromuscular control and stretching into end range).      Manual Treatments:  10/1: Hawk  IASTM to R-Achilles/distal gastroc and evertors and stretches to increase DF x 10 min  , : PROM R ankle all directions, splay mobs, distraction mobs, Great toe flexion/extension x 10 min     Modalities:      Timed Code Patient meets criteria for surgery at this program and is referred to the physician  Family Constellation : Connie Washburn lives with her   He is her second  of 35 yrs  She has two grown daughters from her firs marriage  She has two grown step children but they are estranged  Work and work hours: Retired from being a   Loves organizing  Additional comments/stressors related to family/relationships/peer support:  Supported by her spouse and two daughters  Few friends and Covid had impact on her connection to others  She loves dogs but the death of their last dog a few years ago was too difficulty for them to get another  She is committed to [employer] but left their [employer] through a conflict and are not involved at the present  Looking for inspiration about ministry and will visit churches after covid is over  Difficult childhood: found 23 y o  brother after his suicide,  Mother was undiagnosed bi-polar with drinking issues, father absent as a   Estranged from spous's children as they began their relationship when their mother was pregnant with younger child  Attempts to reconcile have been unsuccessful  Chronic pain and discomfort from OAB  Next appointment Dr Dada Hutchinson in June to explore surgical options  Also calling [facility] for medical Group [employer]  Laverne Olsen, MSW, LCSW  _____________________________      BARIATRIC SURGERY EDUCATION CHECKLIST     I have received education related to my bariatric surgery process and understand:     Patients may be required to complete a psychiatric evaluation and receive clearance for surgery from their psychiatrist      Patients who undergo weight loss surgery are at higher risk of increased mental health concerns and suicide attempts  Patients may be required to complete a full substance abuse evaluation and then complete all treatment recommendations prior to surgery       If diagnosis of abuse/dependence results, patient may be required to remain sober for one (1) year before having bariatric surgery  Patients on psychiatric medications should check with their provider to discuss psychiatric medications and the changes in absorption  Patient should discuss all time release medications with provider and take all medications as prescribed  The recommendation is that there is no use of  any tobacco products, Hookah or  vapes for the bariatric post-operation patient  Bariatric surgery patients should not consume alcohol as a post-operative patient as it may increase risk of numerous health conditions including but not limited to alcohol abuse and ulcers  There is a possibility of weight regain if patient does not follow all program guidelines and recommendations  Bariatric surgery patients should exercise thirty (30) to sixty (60) minutes per day to maintain post-surgical weight loss  Research indicates that bariatric patients are more successful when they see a therapist for up to two (2) years post-op  Patients will follow all medical and dietary recommendations provided  Patient will keep all scheduled appointments and follow up with their physician for a minimum of five (5) years  Patient will take all vitamins as recommended  Post-operative vitamins are life-long  Patient reviewed Bariatric Surgery Education Checklist and agrees they have received education on these issues

## 2022-04-18 ENCOUNTER — HOSPITAL ENCOUNTER (OUTPATIENT)
Dept: BONE DENSITY | Facility: CLINIC | Age: 67
Discharge: HOME | End: 2022-04-18
Payer: COMMERCIAL

## 2022-04-18 DIAGNOSIS — Z13.820 SCREENING FOR OSTEOPOROSIS: ICD-10-CM

## 2022-04-18 DIAGNOSIS — Z78.0 ASYMPTOMATIC MENOPAUSAL STATE: ICD-10-CM

## 2022-04-18 PROCEDURE — 77080 DXA BONE DENSITY AXIAL: CPT

## 2022-07-18 ENCOUNTER — APPOINTMENT (OUTPATIENT)
Dept: LAB | Facility: HOSPITAL | Age: 67
End: 2022-07-18
Attending: FAMILY MEDICINE
Payer: COMMERCIAL

## 2022-07-18 ENCOUNTER — HOSPITAL ENCOUNTER (OUTPATIENT)
Dept: RADIOLOGY | Facility: HOSPITAL | Age: 67
Discharge: HOME | End: 2022-07-18
Attending: FAMILY MEDICINE
Payer: COMMERCIAL

## 2022-07-18 DIAGNOSIS — M25.559 HIP PAIN: ICD-10-CM

## 2022-07-18 PROCEDURE — 73522 X-RAY EXAM HIPS BI 3-4 VIEWS: CPT

## 2022-10-14 ENCOUNTER — TELEPHONE (OUTPATIENT)
Dept: OBGYN CLINIC | Facility: HOSPITAL | Age: 67
End: 2022-10-14

## 2022-10-14 NOTE — TELEPHONE ENCOUNTER
Caller: Patient    Doctor: Michael Wright    Reason for call: Would like to be added on the cancellations list    Call back#: [phone]

## 2022-10-25 ENCOUNTER — APPOINTMENT (OUTPATIENT)
Dept: LAB | Facility: HOSPITAL | Age: 67
End: 2022-10-25
Attending: FAMILY MEDICINE
Payer: COMMERCIAL

## 2022-10-25 DIAGNOSIS — E55.9 AVITAMINOSIS D: ICD-10-CM

## 2022-10-25 DIAGNOSIS — E78.5 HYPERLIPIDEMIA, UNSPECIFIED HYPERLIPIDEMIA TYPE: ICD-10-CM

## 2022-10-25 DIAGNOSIS — R73.01 IMPAIRED FASTING GLUCOSE: ICD-10-CM

## 2022-10-25 DIAGNOSIS — I10 ESSENTIAL HYPERTENSION, MALIGNANT: ICD-10-CM

## 2022-10-25 LAB
25(OH)D3 SERPL-MCNC: 20.4 NG/ML (ref 30–100)
ALBUMIN SERPL BCP-MCNC: 3.6 G/DL (ref 3.5–5)
ALP SERPL-CCNC: 102 U/L (ref 34–104)
ALT SERPL W P-5'-P-CCNC: 26 U/L (ref 7–52)
ANION GAP SERPL CALCULATED.3IONS-SCNC: 8 MMOL/L (ref 4–13)
AST SERPL W P-5'-P-CCNC: 17 U/L (ref 13–39)
BASOPHILS # BLD AUTO: 0.06 THOUSANDS/ÂΜL (ref 0–0.1)
BASOPHILS NFR BLD AUTO: 1 % (ref 0–1)
BILIRUB SERPL-MCNC: 0.47 MG/DL (ref 0.2–1)
BUN SERPL-MCNC: 18 MG/DL (ref 5–25)
CALCIUM SERPL-MCNC: 9.2 MG/DL (ref 8.4–10.2)
CHLORIDE SERPL-SCNC: 101 MMOL/L (ref 96–108)
CHOLEST SERPL-MCNC: 237 MG/DL
CO2 SERPL-SCNC: 28 MMOL/L (ref 21–32)
CREAT SERPL-MCNC: 0.76 MG/DL (ref 0.6–1.3)
EOSINOPHIL # BLD AUTO: 0.11 THOUSAND/ÂΜL (ref 0–0.61)
EOSINOPHIL NFR BLD AUTO: 1 % (ref 0–6)
ERYTHROCYTE [DISTWIDTH] IN BLOOD BY AUTOMATED COUNT: 13.7 % (ref 11.6–15.1)
EST. AVERAGE GLUCOSE BLD GHB EST-MCNC: 126 MG/DL
GFR SERPL CREATININE-BSD FRML MDRD: 80 ML/MIN/1.73SQ M
GLUCOSE P FAST SERPL-MCNC: 97 MG/DL (ref 65–99)
HBA1C MFR BLD: 6 %
HCT VFR BLD AUTO: 46.3 % (ref 34.8–46.1)
HDLC SERPL-MCNC: 35 MG/DL
HGB BLD-MCNC: 15.2 G/DL (ref 11.5–15.4)
IMM GRANULOCYTES # BLD AUTO: 0.08 THOUSAND/UL (ref 0–0.2)
IMM GRANULOCYTES NFR BLD AUTO: 1 % (ref 0–2)
LDLC SERPL CALC-MCNC: 149 MG/DL (ref 0–100)
LYMPHOCYTES # BLD AUTO: 2.19 THOUSANDS/ÂΜL (ref 0.6–4.47)
LYMPHOCYTES NFR BLD AUTO: 18 % (ref 14–44)
MCH RBC QN AUTO: 31.1 PG (ref 26.8–34.3)
MCHC RBC AUTO-ENTMCNC: 32.8 G/DL (ref 31.4–37.4)
MCV RBC AUTO: 95 FL (ref 82–98)
MONOCYTES # BLD AUTO: 1.06 THOUSAND/ÂΜL (ref 0.17–1.22)
MONOCYTES NFR BLD AUTO: 9 % (ref 4–12)
NEUTROPHILS # BLD AUTO: 8.79 THOUSANDS/ÂΜL (ref 1.85–7.62)
NEUTS SEG NFR BLD AUTO: 70 % (ref 43–75)
NONHDLC SERPL-MCNC: 202 MG/DL
NRBC BLD AUTO-RTO: 0 /100 WBCS
PLATELET # BLD AUTO: 237 THOUSANDS/UL (ref 149–390)
PMV BLD AUTO: 10.2 FL (ref 8.9–12.7)
POTASSIUM SERPL-SCNC: 4 MMOL/L (ref 3.5–5.3)
PROT SERPL-MCNC: 7 G/DL (ref 6.4–8.4)
RBC # BLD AUTO: 4.88 MILLION/UL (ref 3.81–5.12)
SODIUM SERPL-SCNC: 137 MMOL/L (ref 135–147)
TRIGL SERPL-MCNC: 265 MG/DL
TSH SERPL DL<=0.05 MIU/L-ACNC: 2.89 UIU/ML (ref 0.45–4.5)
WBC # BLD AUTO: 12.29 THOUSAND/UL (ref 4.31–10.16)

## 2022-10-25 PROCEDURE — 80053 COMPREHEN METABOLIC PANEL: CPT

## 2022-10-25 PROCEDURE — 83036 HEMOGLOBIN GLYCOSYLATED A1C: CPT

## 2022-10-25 PROCEDURE — 80061 LIPID PANEL: CPT

## 2022-10-25 PROCEDURE — 36415 COLL VENOUS BLD VENIPUNCTURE: CPT

## 2022-10-25 PROCEDURE — 82306 VITAMIN D 25 HYDROXY: CPT

## 2022-10-25 PROCEDURE — 85025 COMPLETE CBC W/AUTO DIFF WBC: CPT

## 2022-10-25 PROCEDURE — 84443 ASSAY THYROID STIM HORMONE: CPT

## 2023-02-28 NOTE — PROGRESS NOTES
Assessment and Plan:   Ms General Ramos is a 25-year-old female with history significant for primary generalized osteoarthritis including lumbar degenerative arthritis and fibromyalgia who presents for further evaluation of diffuse joint and muscle pain  She is referred by Dr Vanessa Velez for a rheumatology consult  Surya Virgen presents today for an evaluation of chronic diffuse arthralgias and myalgias which appears representative of primary generalized osteoarthritis and fibromyalgia based on her symptoms and physical examination today  I have low suspicion for an underlying inflammatory arthropathy but to complete the work-up I will obtain the tests as listed below  A follow-up appointment with rheumatology will be determined based on the results of this testing  We did discuss if her presentation is consistent with noninflammatory etiologies of pain including the osteoarthritis and fibromyalgia the she will continue follow-up with her primary care physician for management of these conditions  She may also need to reestablish with spine and pain management to address the chronic back pain as a result of degenerative arthritis  - As she is also presenting with evidence of bilateral trochanteric bursitis I offered her intrabursal cortisone injections today which she tolerated well  If this is beneficial to her she may contact the office in approximately 3 months for repeat injections  She may consider home exercises to manage her symptoms along with application of topical diclofenac gel as needed  Large joint arthrocentesis: bilateral greater trochanteric bursa  Universal Protocol:  Consent: Verbal consent obtained    Risks and benefits: risks, benefits and alternatives were discussed  Consent given by: patient  Patient understanding: patient states understanding of the procedure being performed  Patient consent: the patient's understanding of the procedure matches consent given  Procedure consent: procedure consent matches procedure scheduled  Site marked: the operative site was marked  Patient identity confirmed: verbally with patient    Supporting Documentation  Indications: pain   Procedure Details  Location: hip - bilateral greater trochanteric bursa  Preparation: Patient was prepped and draped in the usual sterile fashion  Needle size: 25 G  Ultrasound guidance: no  Approach: lateral    Medications (Right): 4 mL bupivacaine (PF) 0 75 %Medications (Left): 4 mL bupivacaine (PF) 0 75 %   Patient tolerance: patient tolerated the procedure well with no immediate complications  Dressing:  Sterile dressing applied      Plan:  Diagnoses and all orders for this visit:    Primary generalized (osteo)arthritis  -     Sjogren's Antibodies; Future  -     Sedimentation rate, automated; Future  -     C-reactive protein; Future  -     RF Screen w/ Reflex to Titer; Future  -     Cyclic citrul peptide antibody, IgG; Future  -     Uric acid; Future    Fibromyalgia    Trochanteric bursitis of both hips  -     triamcinolone acetonide (KENALOG-40) 40 mg/mL injection 40 mg  -     triamcinolone acetonide (KENALOG-40) 40 mg/mL injection 40 mg  -     Large joint arthrocentesis  -     Large joint arthrocentesis  -     Large joint arthrocentesis: bilateral greater trochanteric bursa    Other orders  -     traMADol (ULTRAM-ER) 200 MG 24 hr tablet; Take 200 mg by mouth daily      I have personally reviewed pertinent films in PACS of the bilateral hip XR which shows osteoarthritis  Activities as tolerated  Exercise: try to maintain a low impact exercise regimen as much as possible  Walk for 30 minutes a day for at least 3 days a week  Continue other medications as prescribed by PCP and other specialists  RTC PRN depending on results  HPI  Ms Home oS is a 25-year-old female with history significant for primary generalized osteoarthritis including lumbar degenerative arthritis and fibromyalgia who presents for further evaluation of diffuse joint and muscle pain  She is referred by Dr Chance Paris for a rheumatology consult  Patient reports she has experienced back and widespread joint/muscle pain for approximately 20 years but her symptoms have gradually progressed over time  She is in some degree of discomfort on a daily basis in a constant manner  Her back pain appears to bother her the most and she has seen multiple spine and pain management specialists who have recommended interventional procedures which she has not responded to  They have not started her on pain medications  Other than the chronic back pain she also describes pain on the outer aspects of her bilateral hips which does worsen especially if she lays on that side at night  Her right side is more prominent  She also describes chronic pain in her knees where she is known to have advanced osteoarthritis and was informed she is a candidate for total knee replacement surgery but she would like to hold off on this for as long as possible  Additionally she describes chronic pain in her bilateral ankles as a result of swelling  She has not noticed swelling of other joints  She experiences morning stiffness affecting her diffusely that takes about 45 minutes to an hour to improve  She is currently on a regimen of tramadol 50 mg 4 times a day as needed prescribed by her primary care physician, Tylenol up to 3 g daily in divided doses as well as ibuprofen 600 mg 2-3 times a day as needed  She mentions this regimen does take the edge off her symptoms but she is still in pain  She has not been offered stronger opioid medications at any time due to concerns for developing tolerance  She has previously seen rheumatology [Dr Ruth Paz and was diagnosed with primary generalized osteoarthritis and fibromyalgia    She was previously on gabapentin 600 mg 3 times daily and duloxetine 60 mg once daily but as she did not feel like these medications were helping with her symptoms so she discontinued them approximately 3 years ago  She was on them for around 2 years  She denies inflammatory eye disease, psoriasis, inflammatory bowel disease or a family history of autoimmune disease  Of note in 2017 she did have a normal JULIAN and rheumatoid factor  The following portions of the patient's history were reviewed and updated as appropriate: allergies, current medications, past family history, past medical history, past social history, past surgical history and problem list       Review of Systems  Constitutional: Negative for weight change, fevers, chills, night sweats  Positive for fatigue  ENT/Mouth: Negative for hearing changes, ear pain, nasal congestion, sinus pain, hoarseness, sore throat, rhinorrhea, swallowing difficulty  Eyes: Negative for pain, redness, discharge, vision changes  Cardiovascular: Negative for chest pain, SOB, palpitations  Respiratory: Negative for cough, sputum, wheezing, dyspnea  Gastrointestinal: Negative for nausea, vomiting, diarrhea, constipation, pain, heartburn  Genitourinary: Negative for dysuria, urinary frequency, hematuria  Musculoskeletal: As per HPI  Skin: Negative for skin rash, color changes  Neuro: Negative for weakness, numbness, tingling, loss of consciousness  Psych: Negative for anxiety, depression  Heme/Lymph: Negative for easy bruising, bleeding, lymphadenopathy          Past Medical History:   Diagnosis Date   • Fibromyalgia, primary    • Graves disease    • Hyperlipidemia    • Vitamin D deficiency        Past Surgical History:   Procedure Laterality Date   • CATARACT EXTRACTION Bilateral    • SHOULDER ARTHROTOMY     • TONSILLECTOMY AND ADENOIDECTOMY         Social History     Socioeconomic History   • Marital status: /Civil Union     Spouse name: Not on file   • Number of children: Not on file   • Years of education: Not on file   • Highest education level: Not on file   Occupational History   • Not on file   Tobacco Use   • Smoking status: Former     Types: Cigarettes     Quit date:      Years since quittin 1   • Smokeless tobacco: Never   Substance and Sexual Activity   • Alcohol use: Not on file   • Drug use: Never   • Sexual activity: Not on file   Other Topics Concern   • Not on file   Social History Narrative   • Not on file     Social Determinants of Health     Financial Resource Strain: Not on file   Food Insecurity: Not on file   Transportation Needs: Not on file   Physical Activity: Not on file   Stress: Not on file   Social Connections: Not on file   Intimate Partner Violence: Not on file   Housing Stability: Not on file       History reviewed  No pertinent family history  No Known Allergies      Current Outpatient Medications:   •  traMADol (ULTRAM-ER) 200 MG 24 hr tablet, Take 200 mg by mouth daily, Disp: , Rfl:   No current facility-administered medications for this visit  Objective:    Vitals:    23 0857   BP: 138/74   BP Location: Left arm   Patient Position: Sitting   Cuff Size: Large   Pulse: 78   Height: 5' 2 5" (1 588 m)       Physical Exam  General: Well appearing, well nourished, in no distress  Oriented x 3, normal mood and affect  Ambulating with aid of a wheelchair  Overweight  Skin: Good turgor, no rash, unusual bruising or prominent lesions  Hair: Normal texture and distribution  Nails: Normal color, no deformities  HEENT:  Head: Normocephalic, atraumatic  Eyes: Conjunctiva clear, sclera non-icteric, EOM intact  Extremities: No amputations or deformities, cyanosis, edema  Musculoskeletal:   Hands, wrists, elbows and shoulders - tenderness only noted to palpation of the left elbow  There is no obvious soft tissue swelling of these joints  Hips - bilateral trochanteric bursa tenderness noted  Knees - no soft tissue swelling or tenderness noted    Ankles and feet - bilateral lower extremity mildly pitting edema noted as well as tenderness to palpation of her bilateral ankles  4/18 positive fibromyalgia tender points on examination today  Neurologic: Alert and oriented  No focal neurological deficits appreciated  Psychiatric: Normal mood and affect  MICHAEL Hendricks    Rheumatology

## 2023-03-01 ENCOUNTER — APPOINTMENT (OUTPATIENT)
Dept: LAB | Facility: CLINIC | Age: 68
End: 2023-03-01

## 2023-03-01 ENCOUNTER — OFFICE VISIT (OUTPATIENT)
Dept: RHEUMATOLOGY | Facility: CLINIC | Age: 68
End: 2023-03-01

## 2023-03-01 VITALS
HEART RATE: 78 BPM | BODY MASS INDEX: 47.01 KG/M2 | DIASTOLIC BLOOD PRESSURE: 74 MMHG | HEIGHT: 63 IN | SYSTOLIC BLOOD PRESSURE: 138 MMHG

## 2023-03-01 DIAGNOSIS — M70.61 TROCHANTERIC BURSITIS OF BOTH HIPS: ICD-10-CM

## 2023-03-01 DIAGNOSIS — M15.0 PRIMARY GENERALIZED (OSTEO)ARTHRITIS: Primary | ICD-10-CM

## 2023-03-01 DIAGNOSIS — M79.7 FIBROMYALGIA: ICD-10-CM

## 2023-03-01 DIAGNOSIS — M15.0 PRIMARY GENERALIZED (OSTEO)ARTHRITIS: ICD-10-CM

## 2023-03-01 DIAGNOSIS — M70.62 TROCHANTERIC BURSITIS OF BOTH HIPS: ICD-10-CM

## 2023-03-01 LAB
CRP SERPL QL: 15.8 MG/L
ERYTHROCYTE [SEDIMENTATION RATE] IN BLOOD: 53 MM/HOUR (ref 0–29)
URATE SERPL-MCNC: 7.1 MG/DL (ref 2–7.5)

## 2023-03-01 RX ORDER — TRAMADOL HYDROCHLORIDE 200 MG/1
200 TABLET, EXTENDED RELEASE ORAL DAILY
COMMUNITY

## 2023-03-01 RX ORDER — BUPIVACAINE HYDROCHLORIDE 7.5 MG/ML
4 INJECTION, SOLUTION EPIDURAL; RETROBULBAR
Status: COMPLETED | OUTPATIENT
Start: 2023-03-01 | End: 2023-03-01

## 2023-03-01 RX ORDER — TRIAMCINOLONE ACETONIDE 40 MG/ML
40 INJECTION, SUSPENSION INTRA-ARTICULAR; INTRAMUSCULAR ONCE
Status: COMPLETED | OUTPATIENT
Start: 2023-03-01 | End: 2023-03-01

## 2023-03-01 RX ADMIN — BUPIVACAINE HYDROCHLORIDE 4 ML: 7.5 INJECTION, SOLUTION EPIDURAL; RETROBULBAR at 12:41

## 2023-03-01 RX ADMIN — TRIAMCINOLONE ACETONIDE 40 MG: 40 INJECTION, SUSPENSION INTRA-ARTICULAR; INTRAMUSCULAR at 12:49

## 2023-03-01 RX ADMIN — TRIAMCINOLONE ACETONIDE 40 MG: 40 INJECTION, SUSPENSION INTRA-ARTICULAR; INTRAMUSCULAR at 12:48

## 2023-03-02 LAB
ENA SS-A AB SER-ACNC: <0.2 AI (ref 0–0.9)
ENA SS-B AB SER-ACNC: <0.2 AI (ref 0–0.9)
RHEUMATOID FACT SER QL LA: NEGATIVE

## 2023-03-03 ENCOUNTER — TELEPHONE (OUTPATIENT)
Dept: RHEUMATOLOGY | Facility: CLINIC | Age: 68
End: 2023-03-03

## 2023-03-03 LAB — CCP AB SER IA-ACNC: 1.7

## 2023-03-03 NOTE — TELEPHONE ENCOUNTER
Called and spoke to pt  Relayed provider message  Pt verbalized understanding  Pt asks if she is still able to see you for hip injections?

## 2023-03-03 NOTE — TELEPHONE ENCOUNTER
Please let her know the specific autoimmune arthritis testing is normal  The non specific inflammation markers are elevated comparable to what they have been previously  Overall I suspect her diagnosis is OA and FM so she will follow up with her PCP and [facility] like we discussed

## 2023-03-03 NOTE — TELEPHONE ENCOUNTER
Spoke with patient and she is doing great after her injections  She did say she will call if she needs to repeat

## 2023-04-02 ENCOUNTER — APPOINTMENT (OUTPATIENT)
Dept: LAB | Facility: CLINIC | Age: 68
End: 2023-04-02

## 2023-04-02 DIAGNOSIS — E78.2 MIXED HYPERLIPIDEMIA: ICD-10-CM

## 2023-04-02 DIAGNOSIS — I10 ESSENTIAL HYPERTENSION, MALIGNANT: ICD-10-CM

## 2023-04-02 DIAGNOSIS — R73.01 IMPAIRED FASTING GLUCOSE: ICD-10-CM

## 2023-04-02 DIAGNOSIS — E55.9 AVITAMINOSIS D: ICD-10-CM

## 2023-04-02 LAB
25(OH)D3 SERPL-MCNC: 51.7 NG/ML (ref 30–100)
ALBUMIN SERPL BCP-MCNC: 3.5 G/DL (ref 3.5–5)
ALP SERPL-CCNC: 101 U/L (ref 34–104)
ALT SERPL W P-5'-P-CCNC: 24 U/L (ref 7–52)
ANION GAP SERPL CALCULATED.3IONS-SCNC: 5 MMOL/L (ref 4–13)
AST SERPL W P-5'-P-CCNC: 14 U/L (ref 13–39)
BASOPHILS # BLD AUTO: 0.04 THOUSANDS/ÂΜL (ref 0–0.1)
BASOPHILS NFR BLD AUTO: 0 % (ref 0–1)
BILIRUB SERPL-MCNC: 0.4 MG/DL (ref 0.2–1)
BUN SERPL-MCNC: 29 MG/DL (ref 5–25)
CALCIUM SERPL-MCNC: 9.1 MG/DL (ref 8.4–10.2)
CHLORIDE SERPL-SCNC: 103 MMOL/L (ref 96–108)
CHOLEST SERPL-MCNC: 209 MG/DL
CO2 SERPL-SCNC: 31 MMOL/L (ref 21–32)
CREAT SERPL-MCNC: 0.81 MG/DL (ref 0.6–1.3)
EOSINOPHIL # BLD AUTO: 0.08 THOUSAND/ÂΜL (ref 0–0.61)
EOSINOPHIL NFR BLD AUTO: 1 % (ref 0–6)
ERYTHROCYTE [DISTWIDTH] IN BLOOD BY AUTOMATED COUNT: 14.2 % (ref 11.6–15.1)
EST. AVERAGE GLUCOSE BLD GHB EST-MCNC: 123 MG/DL
GFR SERPL CREATININE-BSD FRML MDRD: 73 ML/MIN/1.73SQ M
GLUCOSE P FAST SERPL-MCNC: 94 MG/DL (ref 65–99)
HBA1C MFR BLD: 5.9 %
HCT VFR BLD AUTO: 48.3 % (ref 34.8–46.1)
HDLC SERPL-MCNC: 40 MG/DL
HGB BLD-MCNC: 15.5 G/DL (ref 11.5–15.4)
IMM GRANULOCYTES # BLD AUTO: 0.07 THOUSAND/UL (ref 0–0.2)
IMM GRANULOCYTES NFR BLD AUTO: 1 % (ref 0–2)
LDLC SERPL CALC-MCNC: 138 MG/DL (ref 0–100)
LYMPHOCYTES # BLD AUTO: 1.83 THOUSANDS/ÂΜL (ref 0.6–4.47)
LYMPHOCYTES NFR BLD AUTO: 18 % (ref 14–44)
MCH RBC QN AUTO: 31.6 PG (ref 26.8–34.3)
MCHC RBC AUTO-ENTMCNC: 32.1 G/DL (ref 31.4–37.4)
MCV RBC AUTO: 98 FL (ref 82–98)
MONOCYTES # BLD AUTO: 0.93 THOUSAND/ÂΜL (ref 0.17–1.22)
MONOCYTES NFR BLD AUTO: 9 % (ref 4–12)
NEUTROPHILS # BLD AUTO: 7.41 THOUSANDS/ÂΜL (ref 1.85–7.62)
NEUTS SEG NFR BLD AUTO: 71 % (ref 43–75)
NONHDLC SERPL-MCNC: 169 MG/DL
NRBC BLD AUTO-RTO: 0 /100 WBCS
PLATELET # BLD AUTO: 216 THOUSANDS/UL (ref 149–390)
PMV BLD AUTO: 9.7 FL (ref 8.9–12.7)
POTASSIUM SERPL-SCNC: 4.7 MMOL/L (ref 3.5–5.3)
PROT SERPL-MCNC: 6.9 G/DL (ref 6.4–8.4)
RBC # BLD AUTO: 4.91 MILLION/UL (ref 3.81–5.12)
SODIUM SERPL-SCNC: 139 MMOL/L (ref 135–147)
TRIGL SERPL-MCNC: 154 MG/DL
TSH SERPL DL<=0.05 MIU/L-ACNC: 1.59 UIU/ML (ref 0.45–4.5)
WBC # BLD AUTO: 10.36 THOUSAND/UL (ref 4.31–10.16)

## 2023-06-15 ENCOUNTER — OFFICE VISIT (OUTPATIENT)
Dept: RHEUMATOLOGY | Facility: CLINIC | Age: 68
End: 2023-06-15
Payer: MEDICARE

## 2023-06-15 ENCOUNTER — TELEPHONE (OUTPATIENT)
Dept: RHEUMATOLOGY | Facility: CLINIC | Age: 68
End: 2023-06-15

## 2023-06-15 ENCOUNTER — TELEPHONE (OUTPATIENT)
Dept: OBGYN CLINIC | Facility: HOSPITAL | Age: 68
End: 2023-06-15

## 2023-06-15 VITALS
DIASTOLIC BLOOD PRESSURE: 80 MMHG | WEIGHT: 261 LBS | BODY MASS INDEX: 46.25 KG/M2 | HEIGHT: 63 IN | SYSTOLIC BLOOD PRESSURE: 140 MMHG

## 2023-06-15 DIAGNOSIS — M70.62 TROCHANTERIC BURSITIS OF BOTH HIPS: Primary | ICD-10-CM

## 2023-06-15 DIAGNOSIS — M17.0 PRIMARY OSTEOARTHRITIS OF BOTH KNEES: ICD-10-CM

## 2023-06-15 DIAGNOSIS — M70.61 TROCHANTERIC BURSITIS OF BOTH HIPS: Primary | ICD-10-CM

## 2023-06-15 DIAGNOSIS — M16.0 PRIMARY OSTEOARTHRITIS OF BOTH HIPS: ICD-10-CM

## 2023-06-15 PROCEDURE — 99213 OFFICE O/P EST LOW 20 MIN: CPT | Performed by: PHYSICIAN ASSISTANT

## 2023-06-15 PROCEDURE — 20610 DRAIN/INJ JOINT/BURSA W/O US: CPT | Performed by: PHYSICIAN ASSISTANT

## 2023-06-15 RX ORDER — LIDOCAINE HYDROCHLORIDE 10 MG/ML
1 INJECTION, SOLUTION INFILTRATION; PERINEURAL
Status: COMPLETED | OUTPATIENT
Start: 2023-06-15 | End: 2023-06-15

## 2023-06-15 RX ORDER — LATANOPROST 50 UG/ML
SOLUTION/ DROPS OPHTHALMIC
COMMUNITY
Start: 2023-02-27

## 2023-06-15 RX ORDER — OMEGA-3 FATTY ACIDS/FISH OIL 300-1000MG
CAPSULE ORAL
COMMUNITY
Start: 2022-10-01

## 2023-06-15 RX ORDER — METHYLPREDNISOLONE ACETATE 40 MG/ML
1 INJECTION, SUSPENSION INTRA-ARTICULAR; INTRALESIONAL; INTRAMUSCULAR; SOFT TISSUE
Status: COMPLETED | OUTPATIENT
Start: 2023-06-15 | End: 2023-06-15

## 2023-06-15 RX ADMIN — METHYLPREDNISOLONE ACETATE 1 ML: 40 INJECTION, SUSPENSION INTRA-ARTICULAR; INTRALESIONAL; INTRAMUSCULAR; SOFT TISSUE at 14:45

## 2023-06-15 RX ADMIN — LIDOCAINE HYDROCHLORIDE 1 ML: 10 INJECTION, SOLUTION INFILTRATION; PERINEURAL at 14:45

## 2023-06-15 NOTE — PROGRESS NOTES
"Assessment and Plan:   Ms Cindy East is a 76 yo female who presents for rheumatology follow-up for OA and bilateral greater trochanteric bursitis  The patient underwent bilateral trochanteric bursitis injections at her visit 3 months ago and experienced relief for approximately 2 months, but the pain worsened after she started aqua therapy  We discussed that she could receive another set of injections today, which she consented to and tolerated without incident  See below for details  She may continue OTC NSAIDs and Tylenol as needed for pain  In regards to the osteoarthritis of the bilateral knees and hips, I recommended that she reach out to orthopedics, who she followed with in the past for corticosteroid and viscosupplementation injections in the knees  Once she loses weight, she would likely benefit from TKA due to the severity of OA in the knees  She can also discuss with orthopedics if she would be a candidate for ultrasound-guided intra-articular hip joint injections  Follow-up in 3 months with Dr Jimi Montes De Oca for next set of hip bursa injections  Plan:  Diagnoses and all orders for this visit:    Trochanteric bursitis of both hips    Primary osteoarthritis of both knees    Primary osteoarthritis of both hips    Other orders  -     Cholecalciferol 125 MCG (5000 UT) capsule;   Start: 05/19/23 9:42:00 EDT  -     Ibuprofen 200 MG CAPS  -     latanoprost (XALATAN) 0 005 % ophthalmic solution  -     Acetaminophen (TYLENOL ARTHRITIS PAIN PO)  -     Large joint arthrocentesis      Large joint arthrocentesis: bilateral greater trochanteric bursa  Universal Protocol:  Consent: Verbal consent obtained  Written consent not obtained  Risks and benefits: risks, benefits and alternatives were discussed  Consent given by: patient  Time out: Immediately prior to procedure a \"time out\" was called to verify the correct patient, procedure, equipment, support staff and site/side marked as required    Timeout called at: " 6/15/2023 3:00 PM   Patient understanding: patient states understanding of the procedure being performed  Patient consent: the patient's understanding of the procedure matches consent given  Procedure consent: procedure consent matches procedure scheduled  Relevant documents: relevant documents present and verified  Test results: test results available and properly labeled  Site marked: the operative site was marked  Radiology Images displayed and confirmed  If images not available, report reviewed: imaging studies available  Patient identity confirmed: verbally with patient    Supporting Documentation  Indications: pain   Procedure Details  Location: hip - bilateral greater trochanteric bursa  Preparation: Patient was prepped and draped in the usual sterile fashion  Needle size: 25 G  Ultrasound guidance: no  Approach: lateral    Medications (Right): 1 mL lidocaine 1 %; 1 mL methylPREDNISolone acetate 40 mg/mLMedications (Left): 1 mL lidocaine 1 %; 1 mL methylPREDNISolone acetate 40 mg/mL   Patient tolerance: patient tolerated the procedure well with no immediate complications  Dressing:  Sterile dressing applied    After discussing the risks/benefits of bilateral trochanteric bursa steroid injection, including minor risk of infection, bleeding, pain, or ineffectiveness, informed consent was obtained and patient was agreeable to proceed   Using sterile technique, each hip bursa area was prepped with Chloraprep, and topically anesthetized with Ethyl Chloride  Each bursa was entered using a lateral approach and methylprednisolone 40 mg and 1 mL lidocaine 1% were then injected into each bursa and the needle withdrawn   The procedure was well tolerated   Patient was instructed to contact our office with any concerns or questions  I have personally reviewed prior notes, recent laboratory results, and pertinent films in PACS  Activities as tolerated     Exercise: try to maintain a low impact exercise regimen as much as possible  Walk for 30 minutes a day for at least 3 days a week  Continue other medications as prescribed by PCP and other specialists  RTC in 3 months    Rheumatic Disease Summary:  1   OA multiple sites  -Previously seen rheumatology [Dr Romain Huber and dx with primary generalized OA and fibromyalgia  She was previously on gabapentin 600 mg 3 times daily and duloxetine 60 mg once daily but as she did not feel like these medications were helping with her symptoms so she discontinued them approximately 3 years ago  She was on them for around 2 years   -Bilateral knees (severe): Previously received corticosteroid and viscosupplementation injections per Ortho  -Bilateral hips: mild OA on x-ray   -Initial Visit 3/1/2023: Recheck AI serologies  Otherwise, pain most likely secondary to OA and FM   -Visit 6/15/2023: Autoimmune work-up/inflammatory arthritis work-up unremarkable  Pain is secondary to OA and FM  Follow-up Ortho for lateral hip and knee osteoarthritis  2   Bilateral greater trochanteric bursitis  -Visit 3/1/2023: Bilateral bursa injections given in clinic  -Visit 6/15/2023: Bilateral bursa injections given in clinic  Continue exercises and aqua therapy  3  Fibromyalgia     HPI  Ms Mihir Cespedes is a 78 yo female who presents for rheumatology follow-up for OA and bilateral greater trochanteric bursitis  Patient states that she tolerated the last set of bilateral hip bursa injections well and experienced relief a few hours after the injections for approximately 2 months after that  She noticed that the pain started to come back after she started aqua therapy  She would like to lose weight and understands that this is necessary prior to receiving any joint replacements      The following portions of the patient's history were reviewed and updated as appropriate: allergies, current medications, past family history, past medical history, past social history, past surgical history and problem list       Review of Systems  Constitutional: Negative for weight change, fevers, chills, night sweats, fatigue  ENT/Mouth: Negative for hearing changes, ear pain, nasal congestion, sinus pain, hoarseness, sore throat, rhinorrhea, swallowing difficulty  Eyes: Negative for pain, redness, discharge, vision changes  Cardiovascular: Negative for chest pain, SOB, palpitations  Respiratory: Negative for cough, sputum, wheezing, dyspnea  Gastrointestinal: Negative for nausea, vomiting, diarrhea, constipation, pain, heartburn  Genitourinary: Negative for dysuria, urinary frequency, hematuria  Musculoskeletal: As per HPI  Skin: Negative for skin rash, color changes  Neuro: Negative for weakness, numbness, tingling, loss of consciousness  Psych: Negative for anxiety, depression  Heme/Lymph: Negative for easy bruising, bleeding, lymphadenopathy          Past Medical History:   Diagnosis Date   • Fibromyalgia, primary    • Graves disease    • Hyperlipidemia    • Vitamin D deficiency        Past Surgical History:   Procedure Laterality Date   • CATARACT EXTRACTION Bilateral    • SHOULDER ARTHROTOMY     • TONSILLECTOMY AND ADENOIDECTOMY         Social History     Socioeconomic History   • Marital status: /Civil Union     Spouse name: Not on file   • Number of children: Not on file   • Years of education: Not on file   • Highest education level: Not on file   Occupational History   • Not on file   Tobacco Use   • Smoking status: Former     Types: Cigarettes     Quit date:      Years since quittin 4   • Smokeless tobacco: Never   Substance and Sexual Activity   • Alcohol use: Not on file   • Drug use: Never   • Sexual activity: Not on file   Other Topics Concern   • Not on file   Social History Narrative   • Not on file     Social Determinants of Health     Financial Resource Strain: Not on file   Food Insecurity: Not on file   Transportation Needs: Not on file   Physical Activity: Not "on file   Stress: Not on file   Social Connections: Not on file   Intimate Partner Violence: Not on file   Housing Stability: Not on file       History reviewed  No pertinent family history  No Known Allergies      Current Outpatient Medications:   •  Acetaminophen (TYLENOL ARTHRITIS PAIN PO), , Disp: , Rfl:   •  Cholecalciferol 125 MCG (5000 UT) capsule,  Start: 05/19/23 9:42:00 EDT, Disp: , Rfl:   •  Ibuprofen 200 MG CAPS, , Disp: , Rfl:   •  latanoprost (XALATAN) 0 005 % ophthalmic solution, , Disp: , Rfl:   •  traMADol (ULTRAM-ER) 200 MG 24 hr tablet, Take 200 mg by mouth daily, Disp: , Rfl:       Objective:    Vitals:    06/15/23 1442   BP: 140/80   Weight: 118 kg (261 lb)   Height: 5' 2 5\" (1 588 m)       Physical Exam  General: Well appearing, well nourished, in no acute distress  Oriented x 3, normal mood and affect  Ambulating without difficulty  Skin: Good turgor, no rash, unusual bruising or prominent lesions  Hair: Normal texture and distribution  Nails: Normal color, no deformities  HEENT:  Head: Normocephalic, atraumatic  Eyes: Conjunctiva clear, sclera non-icteric, EOM intact  Nose: No external lesions, mucosa non-inflamed  Mouth: Mucous membranes moist, no mucosal lesions  Neck: Supple   Musculoskeletal:   + Tenderness to palpation bilateral greater trochanteric bursa areas  No asymmetry or deformities noted of bilateral upper and lower extremities  Neurologic: Alert and oriented  No focal neurological deficits appreciated  Psychiatric: Normal mood and affect         Geena Ascencio PA-C  Rheumatology  "

## 2023-06-15 NOTE — TELEPHONE ENCOUNTER
Caller: Patient     Doctor: Melinda Diallo    Reason for call: Patient wanted to make sure she can get a CSI today?  Appointment is at 245     Call back#: [phone]

## 2024-02-08 ENCOUNTER — APPOINTMENT (OUTPATIENT)
Dept: LAB | Facility: HOSPITAL | Age: 69
End: 2024-02-08
Payer: MEDICARE

## 2024-02-08 DIAGNOSIS — E55.9 VITAMIN D DEFICIENCY: ICD-10-CM

## 2024-02-08 DIAGNOSIS — I10 HYPERTENSION, ESSENTIAL: ICD-10-CM

## 2024-02-08 DIAGNOSIS — E78.5 HYPERLIPIDEMIA, UNSPECIFIED HYPERLIPIDEMIA TYPE: ICD-10-CM

## 2024-02-08 LAB
25(OH)D3 SERPL-MCNC: 35.7 NG/ML (ref 30–100)
ALBUMIN SERPL BCP-MCNC: 3.4 G/DL (ref 3.5–5)
ALP SERPL-CCNC: 86 U/L (ref 34–104)
ALT SERPL W P-5'-P-CCNC: 39 U/L (ref 7–52)
ANION GAP SERPL CALCULATED.3IONS-SCNC: 5 MMOL/L
AST SERPL W P-5'-P-CCNC: 17 U/L (ref 13–39)
BASOPHILS # BLD AUTO: 0.05 THOUSANDS/ÂΜL (ref 0–0.1)
BASOPHILS NFR BLD AUTO: 1 % (ref 0–1)
BILIRUB SERPL-MCNC: 0.41 MG/DL (ref 0.2–1)
BUN SERPL-MCNC: 27 MG/DL (ref 5–25)
CALCIUM ALBUM COR SERPL-MCNC: 9.6 MG/DL (ref 8.3–10.1)
CALCIUM SERPL-MCNC: 9.1 MG/DL (ref 8.4–10.2)
CHLORIDE SERPL-SCNC: 100 MMOL/L (ref 96–108)
CHOLEST SERPL-MCNC: 222 MG/DL
CO2 SERPL-SCNC: 34 MMOL/L (ref 21–32)
CREAT SERPL-MCNC: 0.74 MG/DL (ref 0.6–1.3)
EOSINOPHIL # BLD AUTO: 0.12 THOUSAND/ÂΜL (ref 0–0.61)
EOSINOPHIL NFR BLD AUTO: 1 % (ref 0–6)
ERYTHROCYTE [DISTWIDTH] IN BLOOD BY AUTOMATED COUNT: 14 % (ref 11.6–15.1)
GFR SERPL CREATININE-BSD FRML MDRD: 82 ML/MIN/1.73SQ M
GLUCOSE P FAST SERPL-MCNC: 112 MG/DL (ref 65–99)
HCT VFR BLD AUTO: 46.3 % (ref 34.8–46.1)
HDLC SERPL-MCNC: 39 MG/DL
HGB BLD-MCNC: 14.1 G/DL (ref 11.5–15.4)
IMM GRANULOCYTES # BLD AUTO: 0.09 THOUSAND/UL (ref 0–0.2)
IMM GRANULOCYTES NFR BLD AUTO: 1 % (ref 0–2)
LDLC SERPL CALC-MCNC: 152 MG/DL (ref 0–100)
LYMPHOCYTES # BLD AUTO: 1.11 THOUSANDS/ÂΜL (ref 0.6–4.47)
LYMPHOCYTES NFR BLD AUTO: 11 % (ref 14–44)
MCH RBC QN AUTO: 31.5 PG (ref 26.8–34.3)
MCHC RBC AUTO-ENTMCNC: 30.5 G/DL (ref 31.4–37.4)
MCV RBC AUTO: 103 FL (ref 82–98)
MONOCYTES # BLD AUTO: 0.82 THOUSAND/ÂΜL (ref 0.17–1.22)
MONOCYTES NFR BLD AUTO: 8 % (ref 4–12)
NEUTROPHILS # BLD AUTO: 7.53 THOUSANDS/ÂΜL (ref 1.85–7.62)
NEUTS SEG NFR BLD AUTO: 78 % (ref 43–75)
NONHDLC SERPL-MCNC: 183 MG/DL
NRBC BLD AUTO-RTO: 0 /100 WBCS
PLATELET # BLD AUTO: 173 THOUSANDS/UL (ref 149–390)
PMV BLD AUTO: 9.5 FL (ref 8.9–12.7)
POTASSIUM SERPL-SCNC: 4.4 MMOL/L (ref 3.5–5.3)
PROT SERPL-MCNC: 6.7 G/DL (ref 6.4–8.4)
RBC # BLD AUTO: 4.48 MILLION/UL (ref 3.81–5.12)
SODIUM SERPL-SCNC: 139 MMOL/L (ref 135–147)
TRIGL SERPL-MCNC: 153 MG/DL
TSH SERPL DL<=0.05 MIU/L-ACNC: 1.58 UIU/ML (ref 0.45–4.5)
WBC # BLD AUTO: 9.72 THOUSAND/UL (ref 4.31–10.16)

## 2024-02-08 PROCEDURE — 84443 ASSAY THYROID STIM HORMONE: CPT

## 2024-02-08 PROCEDURE — 80061 LIPID PANEL: CPT

## 2024-02-08 PROCEDURE — 85025 COMPLETE CBC W/AUTO DIFF WBC: CPT

## 2024-02-08 PROCEDURE — 36415 COLL VENOUS BLD VENIPUNCTURE: CPT

## 2024-02-08 PROCEDURE — 80053 COMPREHEN METABOLIC PANEL: CPT

## 2024-02-08 PROCEDURE — 82306 VITAMIN D 25 HYDROXY: CPT
